# Patient Record
Sex: FEMALE | Race: WHITE | NOT HISPANIC OR LATINO
[De-identification: names, ages, dates, MRNs, and addresses within clinical notes are randomized per-mention and may not be internally consistent; named-entity substitution may affect disease eponyms.]

---

## 2017-06-27 ENCOUNTER — RESULT REVIEW (OUTPATIENT)
Age: 52
End: 2017-06-27

## 2017-09-22 ENCOUNTER — OUTPATIENT (OUTPATIENT)
Dept: OUTPATIENT SERVICES | Facility: HOSPITAL | Age: 52
LOS: 1 days | End: 2017-09-22
Payer: COMMERCIAL

## 2017-09-22 ENCOUNTER — APPOINTMENT (OUTPATIENT)
Dept: MAMMOGRAPHY | Facility: IMAGING CENTER | Age: 52
End: 2017-09-22

## 2017-09-22 DIAGNOSIS — Z00.8 ENCOUNTER FOR OTHER GENERAL EXAMINATION: ICD-10-CM

## 2017-09-22 PROCEDURE — 77067 SCR MAMMO BI INCL CAD: CPT

## 2017-09-22 PROCEDURE — 77063 BREAST TOMOSYNTHESIS BI: CPT

## 2017-09-22 PROCEDURE — 77063 BREAST TOMOSYNTHESIS BI: CPT | Mod: 26

## 2017-09-22 PROCEDURE — G0202: CPT | Mod: 26

## 2018-05-26 ENCOUNTER — TRANSCRIPTION ENCOUNTER (OUTPATIENT)
Age: 53
End: 2018-05-26

## 2018-06-02 ENCOUNTER — EMERGENCY (EMERGENCY)
Facility: HOSPITAL | Age: 53
LOS: 1 days | Discharge: HOME | End: 2018-06-02
Attending: EMERGENCY MEDICINE
Payer: COMMERCIAL

## 2018-06-02 VITALS
DIASTOLIC BLOOD PRESSURE: 80 MMHG | TEMPERATURE: 98 F | HEART RATE: 62 BPM | OXYGEN SATURATION: 96 % | SYSTOLIC BLOOD PRESSURE: 125 MMHG | WEIGHT: 156.97 LBS | HEIGHT: 67 IN | RESPIRATION RATE: 16 BRPM

## 2018-06-02 VITALS
RESPIRATION RATE: 18 BRPM | OXYGEN SATURATION: 99 % | DIASTOLIC BLOOD PRESSURE: 81 MMHG | HEART RATE: 60 BPM | TEMPERATURE: 98 F | SYSTOLIC BLOOD PRESSURE: 135 MMHG

## 2018-06-02 PROCEDURE — 93971 EXTREMITY STUDY: CPT

## 2018-06-02 PROCEDURE — 73590 X-RAY EXAM OF LOWER LEG: CPT | Mod: 26,LT

## 2018-06-02 PROCEDURE — 93971 EXTREMITY STUDY: CPT | Mod: 26

## 2018-06-02 PROCEDURE — 73590 X-RAY EXAM OF LOWER LEG: CPT

## 2018-06-02 PROCEDURE — 99284 EMERGENCY DEPT VISIT MOD MDM: CPT

## 2018-06-02 RX ORDER — AZTREONAM 2 G
1 VIAL (EA) INJECTION
Qty: 20 | Refills: 0 | OUTPATIENT
Start: 2018-06-02 | End: 2018-06-11

## 2018-06-02 RX ADMIN — Medication 1 TABLET(S): at 17:38

## 2018-06-02 NOTE — ED PROVIDER NOTE - MEDICAL DECISION MAKING DETAILS
52yoF pw left lower extremity erythema and edema after injury 8 days ago. likely cellulitis. will rule out fracture and dvt given edema. dc home with antibiotics.

## 2018-06-02 NOTE — ED PROVIDER NOTE - OBJECTIVE STATEMENT
52yoF pw left shin pain and swelling after injuring it 8 days ago. [place], scraping her shin. pain is mild in severity, non radiating, in the shin, constant. no fevers, chills vomiting, or other issues. did not hit head, did not pass out.

## 2018-06-02 NOTE — ED PROVIDER NOTE - CARE PLAN
Principal Discharge DX:	Cellulitis of left lower extremity Principal Discharge DX:	Cellulitis of left lower extremity  Assessment and plan of treatment:	1) Follow up with your doctor in 1 week. Call for an appointment.   2) Return to the ER immediately for new or worsening symptoms including fevers, chills vomiting or worsening redness and pain.   3) Take Bactrim two times a day for the next 10 days.

## 2018-06-02 NOTE — ED ADULT NURSE NOTE - OBJECTIVE STATEMENT
32 yo female presents in the ed for left foot pain- pt is alert and oriented x4, speaking coherently. [place] fell through the gap between the train and the platform 8 days ago- pt states that she went to urgent care 8 days ago when this happened- they did not xray the left foot at that time. Pt presents with swelling, pain and redness today. pt states that she can place weight on the left leg with increased pain. MD at bedside, will continue to reassess.

## 2018-06-02 NOTE — ED PROVIDER NOTE - ATTENDING CONTRIBUTION TO CARE
inj L shin 8 day ago - limited injury  L shin and ankle swelling w ant shin brusing and scab, 3cm x 3cm area x 2 to shin  concern for early cellulitis vs dvt (less likely). at request of sending physician will also xr.

## 2018-06-02 NOTE — ED PROVIDER NOTE - PLAN OF CARE
1) Follow up with your doctor in 1 week. Call for an appointment.   2) Return to the ER immediately for new or worsening symptoms including fevers, chills vomiting or worsening redness and pain.   3) Take Bactrim two times a day for the next 10 days.

## 2018-06-02 NOTE — ED PROVIDER NOTE - PROGRESS NOTE DETAILS
patient awaiting US read. ambulating without difficulty.   Josh GONZALEZ Called by US, problem with US crossing over, normal read by attending radiologist. will dc home.

## 2018-10-26 ENCOUNTER — APPOINTMENT (OUTPATIENT)
Dept: MAMMOGRAPHY | Facility: IMAGING CENTER | Age: 53
End: 2018-10-26
Payer: COMMERCIAL

## 2018-10-26 ENCOUNTER — OUTPATIENT (OUTPATIENT)
Dept: OUTPATIENT SERVICES | Facility: HOSPITAL | Age: 53
LOS: 1 days | End: 2018-10-26
Payer: COMMERCIAL

## 2018-10-26 DIAGNOSIS — Z00.8 ENCOUNTER FOR OTHER GENERAL EXAMINATION: ICD-10-CM

## 2018-10-26 PROCEDURE — 77067 SCR MAMMO BI INCL CAD: CPT

## 2018-10-26 PROCEDURE — 77063 BREAST TOMOSYNTHESIS BI: CPT

## 2018-10-26 PROCEDURE — 77063 BREAST TOMOSYNTHESIS BI: CPT | Mod: 26

## 2018-10-26 PROCEDURE — 77067 SCR MAMMO BI INCL CAD: CPT | Mod: 26

## 2019-05-05 ENCOUNTER — TRANSCRIPTION ENCOUNTER (OUTPATIENT)
Age: 54
End: 2019-05-05

## 2019-12-06 ENCOUNTER — APPOINTMENT (OUTPATIENT)
Dept: MAMMOGRAPHY | Facility: IMAGING CENTER | Age: 54
End: 2019-12-06
Payer: COMMERCIAL

## 2019-12-06 ENCOUNTER — OUTPATIENT (OUTPATIENT)
Dept: OUTPATIENT SERVICES | Facility: HOSPITAL | Age: 54
LOS: 1 days | End: 2019-12-06
Payer: COMMERCIAL

## 2019-12-06 DIAGNOSIS — Z00.8 ENCOUNTER FOR OTHER GENERAL EXAMINATION: ICD-10-CM

## 2019-12-06 PROCEDURE — 77067 SCR MAMMO BI INCL CAD: CPT

## 2019-12-06 PROCEDURE — 77063 BREAST TOMOSYNTHESIS BI: CPT

## 2019-12-06 PROCEDURE — 77063 BREAST TOMOSYNTHESIS BI: CPT | Mod: 26

## 2019-12-06 PROCEDURE — 77067 SCR MAMMO BI INCL CAD: CPT | Mod: 26

## 2019-12-18 ENCOUNTER — APPOINTMENT (OUTPATIENT)
Dept: MAMMOGRAPHY | Facility: IMAGING CENTER | Age: 54
End: 2019-12-18
Payer: COMMERCIAL

## 2019-12-18 ENCOUNTER — OUTPATIENT (OUTPATIENT)
Dept: OUTPATIENT SERVICES | Facility: HOSPITAL | Age: 54
LOS: 1 days | End: 2019-12-18
Payer: COMMERCIAL

## 2019-12-18 ENCOUNTER — APPOINTMENT (OUTPATIENT)
Dept: ULTRASOUND IMAGING | Facility: IMAGING CENTER | Age: 54
End: 2019-12-18
Payer: COMMERCIAL

## 2019-12-18 DIAGNOSIS — Z00.8 ENCOUNTER FOR OTHER GENERAL EXAMINATION: ICD-10-CM

## 2019-12-18 PROCEDURE — 76642 ULTRASOUND BREAST LIMITED: CPT | Mod: 26,RT

## 2019-12-18 PROCEDURE — 76642 ULTRASOUND BREAST LIMITED: CPT

## 2019-12-18 PROCEDURE — G0279: CPT | Mod: 26

## 2019-12-18 PROCEDURE — G0279: CPT

## 2019-12-18 PROCEDURE — 77065 DX MAMMO INCL CAD UNI: CPT | Mod: 26,RT

## 2019-12-18 PROCEDURE — 77065 DX MAMMO INCL CAD UNI: CPT

## 2020-05-22 ENCOUNTER — TRANSCRIPTION ENCOUNTER (OUTPATIENT)
Age: 55
End: 2020-05-22

## 2020-07-16 ENCOUNTER — APPOINTMENT (OUTPATIENT)
Dept: ORTHOPEDIC SURGERY | Facility: CLINIC | Age: 55
End: 2020-07-16
Payer: COMMERCIAL

## 2020-07-16 PROCEDURE — 73562 X-RAY EXAM OF KNEE 3: CPT | Mod: 50

## 2020-07-16 PROCEDURE — 99204 OFFICE O/P NEW MOD 45 MIN: CPT

## 2020-07-16 NOTE — ADDENDUM
[FreeTextEntry1] : I, Jc rCaig, acted solely as a scribe for Dr. Harley Vance on this date 07/16/2020.\par All medical record entries made by the Scribe were at my, Dr. Harley Vance, direction and personally dictated by me on 07/16/2020. I have reviewed the chart and agree that the record accurately reflects my personal performance of the history, physical exam, assessment and plan. I have also personally directed, reviewed, and agreed with the chart.

## 2020-07-16 NOTE — HISTORY OF PRESENT ILLNESS
[de-identified] : 55 year old female presents complaining of bilateral knee pain, left much worse than right. She has had knee pain in the past for which she did a few PT sessions but did not require further treatment. She denies injury at that time. About 3-4 weeks ago, she was running a felt pain in her knee. Her pain is medial bilaterally and worsens when pivoting and twisting. She rates her pain as a 3/10 in severity on average. Her knee does not buckle or swell. She has discomfort when going up and down stairs, and when dancing the [employer], as well. Walking on flat ground does not bother her and she can walk long distances with little to no pain. She cannot run due to sharp pain. She has minimal pain when sleeping at night. 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 She is very active and exercises regularly. She would like to be able to ski without pain.

## 2020-07-16 NOTE — PHYSICAL EXAM
[de-identified] : Constitutional\par o Appearance : well-nourished, well developed, alert, in no acute distress \par Head and Face\par o Head :\par ¦ Inspection : atraumatic, normocephalic\par o Face :\par ¦ Inspection : no visible rash or discoloration\par Respiratory\par o Respiratory Effort: breathing unlabored \par Neurologic\par o Mental Status Examination :\par ¦ Orientation : grossly oriented to person, place and time\par Psychiatric\par o Mood and Affect: mood normal, affect appropriate \par \par Right Lower Extremity\par o Buttock : no tenderness, swelling or deformities \par o Right Hip :\par ¦ Inspection/Palpation : no tenderness, swelling or deformities\par ¦ Range of Motion : full and painless in all planes, no crepitance\par ¦ Stability : joint stability intact\par ¦ Strength : extension, flexion, adduction, abduction, internal rotation and external rotation 5/5 \par \par o Right Knee :\par ¦ Inspection/Palpation : mild medial compartment tenderness, no swelling\par ¦ Range of Motion : 3-120°, no crepitance, decreased patellofemoral glide (right worse than left)\par ¦ Stability : no valgus or varus instability present on provocative testing\par ¦ Strength : flexion and extension 5/5\par ¦ Tests and Signs : Anterior Drawer negative, Lachman negative, Therese's negative\par \par Left Lower Extremity\par o Buttock : no tenderness, swelling or deformities \par o Left Hip :\par ¦ Inspection/Palpation : no tenderness, no swelling or deformities\par ¦ Range of Motion : full and painless in all planes, no crepitance\par ¦ Stability : joint stability intact\par ¦ Strength : extension, flexion, adduction, abduction, internal rotation and external rotation 5/5\par \par o Left Knee :\par ¦ Inspection/Palpation : mild medial compartment and minimal lateral compartment tenderness, no swelling\par ¦ Range of Motion : active flexion and extension full, discomfort with full flexion, no crepitance, slightly decreased patellofemoral glide\par ¦ Stability : no valgus or varus instability present on provocative testing\par ¦ Strength : flexion and extension 5/5\par ¦ Tests and Signs : Anterior Drawer negative, Lachman negative, Therese's negative\par \par Gait: varus deformity, no significant extremity swelling or lymphedema, excellent core strength\par \par Radiology Results:\par o Right Knee: Standing AP, lateral and tunnel views were obtained and revealed severe medial compartment narrowing with moderate patellofemoral arthritis.\par o Left Knee: Standing AP, lateral and tunnel views were obtained and revealed moderate medial and patellofemoral arthritis.

## 2020-07-16 NOTE — DISCUSSION/SUMMARY
[de-identified] : I discussed the underlying pathophysiology of the patient's condition in great detail with the patient. I went over the patient's x-rays with them in great detail. The extent of the patient’s arthritis was discussed in great detail with them. Various treatment modalities including NSAIDs, PT, bracing, cortisone injections, viscosupplementation, and surgical intervention were discussed as a solution for the patient's symptoms. We discussed the use of ice, Tylenol and anti-inflammatories to relieve pain. The patient was instructed in ROM exercises they are to do at home. At-home strengthening, and stretching exercises were discussed and demonstrated with the patient. We went over the wide ranging benefits of exercise for the patient health and I encouraged her to begin a diligent exercise program. She needs to avoid high-impact activities that involve pounding, such as running, jumping, and [place] skiing. She should avoid twisting and pivoting. I recommend alternate activities such as yoga, pilates, barre classes, toning classes, and riding a stationary bike on low tension. Viscosupplementation was discussed as a solution to the patient's symptoms, and we are requesting authorization for Monovisc for both knees. All of her questions were answered. She understands and consents to the plan.\par \par FU after viscosupplementation authorization.\par after following a diligent HEP.

## 2020-07-27 RX ORDER — HYALURONATE SODIUM, STABILIZED 88 MG/4 ML
88 SYRINGE (ML) INTRAARTICULAR
Qty: 2 | Refills: 0 | Status: ACTIVE | COMMUNITY
Start: 2020-07-27

## 2020-07-28 ENCOUNTER — FORM ENCOUNTER (OUTPATIENT)
Age: 55
End: 2020-07-28

## 2020-08-02 ENCOUNTER — FORM ENCOUNTER (OUTPATIENT)
Age: 55
End: 2020-08-02

## 2020-12-16 ENCOUNTER — APPOINTMENT (OUTPATIENT)
Dept: ORTHOPEDIC SURGERY | Facility: CLINIC | Age: 55
End: 2020-12-16
Payer: COMMERCIAL

## 2020-12-16 PROCEDURE — 99072 ADDL SUPL MATRL&STAF TM PHE: CPT

## 2020-12-16 PROCEDURE — 20610 DRAIN/INJ JOINT/BURSA W/O US: CPT | Mod: RT

## 2020-12-16 PROCEDURE — 99214 OFFICE O/P EST MOD 30 MIN: CPT | Mod: 25

## 2021-01-25 ENCOUNTER — FORM ENCOUNTER (OUTPATIENT)
Age: 56
End: 2021-01-25

## 2021-01-25 ENCOUNTER — APPOINTMENT (OUTPATIENT)
Dept: ORTHOPEDIC SURGERY | Facility: CLINIC | Age: 56
End: 2021-01-25
Payer: COMMERCIAL

## 2021-01-25 PROCEDURE — 99072 ADDL SUPL MATRL&STAF TM PHE: CPT

## 2021-01-25 PROCEDURE — 20610 DRAIN/INJ JOINT/BURSA W/O US: CPT | Mod: LT

## 2021-01-25 PROCEDURE — 99214 OFFICE O/P EST MOD 30 MIN: CPT | Mod: 25

## 2021-01-25 RX ORDER — HYALURONATE SODIUM, STABILIZED 88 MG/4 ML
88 SYRINGE (ML) INTRAARTICULAR
Qty: 2 | Refills: 0 | Status: ACTIVE | COMMUNITY
Start: 2021-01-25

## 2021-01-28 ENCOUNTER — APPOINTMENT (OUTPATIENT)
Dept: MAMMOGRAPHY | Facility: IMAGING CENTER | Age: 56
End: 2021-01-28
Payer: COMMERCIAL

## 2021-01-28 ENCOUNTER — OUTPATIENT (OUTPATIENT)
Dept: OUTPATIENT SERVICES | Facility: HOSPITAL | Age: 56
LOS: 1 days | End: 2021-01-28
Payer: COMMERCIAL

## 2021-01-28 DIAGNOSIS — Z00.8 ENCOUNTER FOR OTHER GENERAL EXAMINATION: ICD-10-CM

## 2021-01-28 PROCEDURE — 77067 SCR MAMMO BI INCL CAD: CPT

## 2021-01-28 PROCEDURE — 77063 BREAST TOMOSYNTHESIS BI: CPT | Mod: 26

## 2021-01-28 PROCEDURE — 77067 SCR MAMMO BI INCL CAD: CPT | Mod: 26

## 2021-01-28 PROCEDURE — 77063 BREAST TOMOSYNTHESIS BI: CPT

## 2021-02-22 ENCOUNTER — APPOINTMENT (OUTPATIENT)
Dept: ORTHOPEDIC SURGERY | Facility: CLINIC | Age: 56
End: 2021-02-22
Payer: COMMERCIAL

## 2021-02-22 PROCEDURE — 20610 DRAIN/INJ JOINT/BURSA W/O US: CPT | Mod: RT

## 2021-02-22 PROCEDURE — 99072 ADDL SUPL MATRL&STAF TM PHE: CPT

## 2021-02-22 NOTE — DISCUSSION/SUMMARY
[de-identified] : I went over the pathophysiology of the patient's symptoms in great detail with the patient. The patient elected to receive a Monovisc injection into her right knee today, and tolerated it well. I instructed the patient on ROM exercises, and told them to take it easy. The use of ice and rest was reviewed with the patient. The patient may resume activities tomorrow. I reminded the patient that it takes 4 to 6 weeks after the injection to feel symptom relief. All of her questions were answered. She understands and consents to the plan. \par \par FU 1-2 weeks.\par after a right knee Monovisc injection today (02/22/2021).\par for a left knee Synvisc-One injection.

## 2021-02-22 NOTE — PHYSICAL EXAM
[de-identified] : Constitutional\par o Appearance : well-nourished, well developed, alert, in no acute distress \par Head and Face\par o Head :\par ¦ Inspection : atraumatic, normocephalic\par o Face :\par ¦ Inspection : no visible rash or discoloration\par Respiratory\par o Respiratory Effort: breathing unlabored \par Neurologic\par o Mental Status Examination :\par ¦ Orientation : grossly oriented to person, place and time\par Psychiatric\par o Mood and Affect: mood normal, affect appropriate \par \par Right Lower Extremity\par o Buttock : no tenderness, swelling or deformities \par o Right Hip :\par ¦ Inspection/Palpation : no tenderness, swelling or deformities\par ¦ Range of Motion : full and painless in all planes, no crepitance\par ¦ Stability : joint stability intact\par ¦ Strength : extension, flexion, adduction, abduction, internal rotation and external rotation, 5/5 \par \par o Right Knee :\par ¦ Inspection/Palpation : mild to moderate  medial or lateral compartment tenderness to palpation, no swelling\par ¦ Range of Motion : 5-120° without pain, no crepitance, decreased patellofemoral glide\par ¦ Stability : no valgus or varus instability present on provocative testing\par ¦ Strength : flexion and extension 5/5\par ¦ Tests and Signs : Anterior Drawer negative, Lachman negative, Therese's negative\par \par Left Lower Extremity\par o Buttock : no tenderness, swelling or deformities \par o Left Hip :\par ¦ Inspection/Palpation : no tenderness, no swelling or deformities\par ¦ Range of Motion : full and painless in all planes, no crepitance\par ¦ Stability : joint stability intact\par ¦ Strength : extension, flexion, adduction, abduction, internal rotation and external rotation, 5/5\par \par o Left Knee :\par ¦ Inspection/Palpation : posteromedial joint line tenderness, no swelling\par ¦ Range of Motion : FROM, mild pain on full flexion, no crepitance, decreased patellofemoral glide\par ¦ Stability : no valgus or varus instability present on provocative testing\par ¦ Strength : flexion and extension 5/5\par ¦ Tests and Signs : Anterior Drawer negative, Lachman negative, Therese's negative\par \par Gait: varus deformity bilaterally, no significant extremity swelling or lymphedema, excellent core strength\par \par o Right Knee injection : Indication- knee osteoarthritis, Anatomic location- right intra-articular joint space, Spray - area was sterilized with Betadine and alcohol and anesthetized with Ethyl Chloride, needle used-20G, Medications given- 4cc's Monovisc under Ultrasound guidance, and patient tolerated it well.\par oLerika# [id number]  oExp: 2022-11-30

## 2021-02-22 NOTE — HISTORY OF PRESENT ILLNESS
[de-identified] : 55 year old female presents complaining of bilateral knee pain. Her pain is medial bilaterally and worsens when pivoting and twisting. She has discomfort when going up and down stairs, and when dancing the waltz. She has no pain walking on flat ground. She denies buckling or swelling. She is very active and exercises regularly. She received a right knee cortisone injection on 12/16/2020, and a left knee cortisone injection on 1/25/2021. She presents for right knee viscosupplementation today (02/22/2021).\par \par Radiology Results done 7/16/2020:\par o Right Knee: Standing AP, lateral and tunnel views were obtained and revealed significant medial and moderate patellofemoral arthritis.\par o Left Knee: Standing AP, lateral and tunnel views were obtained and revealed moderate medial and patellofemoral arthritis.

## 2021-02-22 NOTE — ADDENDUM
[FreeTextEntry1] : I, Jc Craig, acted solely as a scribe for Dr. Harley Vance on this date 02/22/2021.\par All medical record entries made by the Scribe were at my, Dr. Harley Vance, direction and personally dictated by me on 02/22/2021. I have reviewed the chart and agree that the record accurately reflects my personal performance of the history, physical exam, assessment and plan. I have also personally directed, reviewed, and agreed with the chart.

## 2021-02-25 ENCOUNTER — APPOINTMENT (OUTPATIENT)
Dept: ORTHOPEDIC SURGERY | Facility: CLINIC | Age: 56
End: 2021-02-25
Payer: COMMERCIAL

## 2021-02-25 PROCEDURE — 20610 DRAIN/INJ JOINT/BURSA W/O US: CPT | Mod: LT

## 2021-02-25 PROCEDURE — 99072 ADDL SUPL MATRL&STAF TM PHE: CPT

## 2021-02-25 NOTE — DISCUSSION/SUMMARY
[de-identified] : I went over the pathophysiology of the patient's symptoms in great detail with the patient. The patient elected to receive a Synvisc-One injection into her left knee today, and tolerated it well. I instructed the patient on ROM exercises, and told them to take it easy. The use of ice and rest was reviewed with the patient. The patient may resume activities tomorrow. I reminded the patient that it takes 4 to 6 weeks after the injection to feel symptom relief. All of her questions were answered. She understands and consents to the plan.\par \par FU 6 weeks.\par after a left knee Synvisc-One injection today (02/25/2021).

## 2021-02-25 NOTE — PHYSICAL EXAM
[de-identified] : Constitutional\par o Appearance : well-nourished, well developed, alert, in no acute distress \par Head and Face\par o Head :\par ¦ Inspection : atraumatic, normocephalic\par o Face :\par ¦ Inspection : no visible rash or discoloration\par Respiratory\par o Respiratory Effort: breathing unlabored \par Neurologic\par o Mental Status Examination :\par ¦ Orientation : grossly oriented to person, place and time\par Psychiatric\par o Mood and Affect: mood normal, affect appropriate \par \par Right Lower Extremity\par o Buttock : no tenderness, swelling or deformities \par o Right Hip :\par ¦ Inspection/Palpation : no tenderness, swelling or deformities\par ¦ Range of Motion : full and painless in all planes, no crepitance\par ¦ Stability : joint stability intact\par ¦ Strength : extension, flexion, adduction, abduction, internal rotation and external rotation, 5/5 \par \par o Right Knee :\par ¦ Inspection/Palpation : mild medial or lateral compartment tenderness to palpation, no swelling\par ¦ Range of Motion : 5-120° without pain, no crepitance, decreased patellofemoral glide\par ¦ Stability : no valgus or varus instability present on provocative testing\par ¦ Strength : flexion and extension 5/5\par ¦ Tests and Signs : Anterior Drawer negative, Lachman negative, Therese's negative\par \par Left Lower Extremity\par o Buttock : no tenderness, swelling or deformities \par o Left Hip :\par ¦ Inspection/Palpation : no tenderness, no swelling or deformities\par ¦ Range of Motion : full and painless in all planes, no crepitance\par ¦ Stability : joint stability intact\par ¦ Strength : extension, flexion, adduction, abduction, internal rotation and external rotation, 5/5\par \par o Left Knee :\par ¦ Inspection/Palpation :moderate posteromedial joint line tenderness, no swelling\par ¦ Range of Motion : FROM, mild pain on full flexion, no crepitance, decreased patellofemoral glide\par ¦ Stability : no valgus or varus instability present on provocative testing\par ¦ Strength : flexion and extension 5/5\par ¦ Tests and Signs : Anterior Drawer negative, Lachman negative, Therese's negative\par \par Gait: varus deformity bilaterally, no significant extremity swelling or lymphedema, excellent core strength\par \par o Left Knee injection : Indication- knee osteoarthritis, Anatomic location- left intra-articular joint space, Spray - area was sterilized with Betadine and alcohol and anesthetized with Ethyl Chloride, needle used-20G, Medications given- 6cc's Synvisc-One under Ultrasound guidance, and patient tolerated it well. \par Annabel# [id number]  oExp: 2023-02-28

## 2021-02-25 NOTE — HISTORY OF PRESENT ILLNESS
[de-identified] : 55 year old female presents complaining of bilateral knee pain. Her pain is medial bilaterally and worsens when pivoting and twisting. She has discomfort when going up and down stairs, and when dancing the waltz. She has no pain walking on flat ground. She denies buckling or swelling. She is very active and exercises regularly. She received a right knee cortisone injection on 12/16/2020, and a left knee cortisone injection on 1/25/2021. She received a right knee Monovisc injection on 2/22/2021. She presents for a left knee Synvisc-One injection today (02/25/2021). She denies any allergies to chickens, eggs or feathers.\par \par Radiology Results done 7/16/2020:\par o Right Knee: Standing AP, lateral and tunnel views were obtained and revealed significant medial and moderate patellofemoral arthritis.\par o Left Knee: Standing AP, lateral and tunnel views were obtained and revealed moderate medial and patellofemoral arthritis.

## 2021-02-25 NOTE — ADDENDUM
[FreeTextEntry1] : I, Jc Craig, acted solely as a scribe for Dr. Harley Vance on this date 02/25/2021.\par All medical record entries made by the Scribe were at my, Dr. Harley Vance, direction and personally dictated by me on 02/25/2021. I have reviewed the chart and agree that the record accurately reflects my personal performance of the history, physical exam, assessment and plan. I have also personally directed, reviewed, and agreed with the chart.

## 2021-04-08 ENCOUNTER — APPOINTMENT (OUTPATIENT)
Dept: ORTHOPEDIC SURGERY | Facility: CLINIC | Age: 56
End: 2021-04-08
Payer: COMMERCIAL

## 2021-04-08 PROCEDURE — 99072 ADDL SUPL MATRL&STAF TM PHE: CPT

## 2021-04-08 PROCEDURE — 99214 OFFICE O/P EST MOD 30 MIN: CPT

## 2021-04-08 NOTE — HISTORY OF PRESENT ILLNESS
[de-identified] : 55 year old female presents with bilateral medial knee pain. Her pain is exacerbated by pivoting and twisting. She notes discomfort when going up and down stairs, and when dancing the waltz. She has no pain walking on flat ground. She denies buckling or swelling. She is very active and exercises regularly. She received right knee cortisone on 12/16/20 and Monovisc on 2/22/21. She reports that her right knee is doing better after her Monovisc injection. She received left knee cortisone on 1/25/21 and Synvisc-One on 2/25/21. She does not feel that the Synvisc-One gave her any relief. She feels that her left knee is worse than it was before the injection.\par \par Radiology Results done 7/16/2020:\par o Right Knee: Standing AP, lateral and tunnel views were obtained and revealed significant medial and moderate patellofemoral arthritis.\par o Left Knee: Standing AP, lateral and tunnel views were obtained and revealed moderate medial and patellofemoral arthritis.

## 2021-04-08 NOTE — ADDENDUM
[FreeTextEntry1] : I, Jc Craig, acted solely as a scribe for Dr. Harley Vance on this date 04/08/2021.\par All medical record entries made by the Scribe were at my, Dr. Harley Vance, direction and personally dictated by me on 04/08/2021. I have reviewed the chart and agree that the record accurately reflects my personal performance of the history, physical exam, assessment and plan. I have also personally directed, reviewed, and agreed with the chart.

## 2021-04-08 NOTE — DISCUSSION/SUMMARY
[de-identified] : I went over the pathophysiology of the patient's symptoms in great detail with the patient. We discussed the use of ice, Tylenol and anti-inflammatories to relieve pain. At-home strengthening, and stretching exercises were discussed and demonstrated with the patient. She needs to avoid high-impact activities such as running and jumping. I recommend alternative activities such as riding a stationary bike on low tension, or the elliptical. She should focus on light weight and high repetition exercises. She should avoid squatting and kneeling. I discussed ergonomic solutions with the patient such as wearing cushioned sneakers with a thicker sole. I would like to see the patient back in the office in 2 months to reassess her condition. If she is still having left knee pain a cortisone injection will be considered. All of her questions were answered. She understands and consents to the plan. \par \par FU 2 months.

## 2021-04-08 NOTE — PHYSICAL EXAM
[de-identified] : Constitutional\par o Appearance : well-nourished, well developed, alert, in no acute distress \par Head and Face\par o Head :\par ¦ Inspection : atraumatic, normocephalic\par o Face :\par ¦ Inspection : no visible rash or discoloration\par Respiratory\par o Respiratory Effort: breathing unlabored \par Neurologic\par o Mental Status Examination :\par ¦ Orientation : grossly oriented to person, place and time\par Psychiatric\par o Mood and Affect: mood normal, affect appropriate \par \par Right Lower Extremity\par o Buttock : no tenderness, swelling or deformities \par o Right Hip :\par ¦ Inspection/Palpation : no tenderness, swelling or deformities\par ¦ Range of Motion : full and painless in all planes, no crepitance\par ¦ Stability : joint stability intact\par ¦ Strength : extension, flexion, adduction, abduction, internal rotation and external rotation, 5/5 \par \par o Right Knee :\par ¦ Inspection/Palpation : mild medial compartment tenderness to palpation, no swelling\par ¦ Range of Motion : 3-120° without pain, no crepitance, decreased patellofemoral glide\par ¦ Stability : no valgus or varus instability present on provocative testing\par ¦ Strength : flexion and extension 5/5\par ¦ Tests and Signs : Anterior Drawer negative, Lachman negative, Therese's negative\par \par Left Lower Extremity\par o Buttock : no tenderness, swelling or deformities \par o Left Hip :\par ¦ Inspection/Palpation : no tenderness, no swelling or deformities\par ¦ Range of Motion : full and painless in all planes, no crepitance\par ¦ Stability : joint stability intact\par ¦ Strength : extension, flexion, adduction, abduction, internal rotation and external rotation, 5/5\par \par o Left Knee :\par ¦ Inspection/Palpation : mild medial compartment tenderness, lateral compartment , no swelling\par ¦ Range of Motion : FROM, lateral compartment pain on full flexion, no crepitance, decreased patellofemoral glide\par ¦ Stability : no valgus or varus instability present on provocative testing\par ¦ Strength : flexion and extension 5/5\par ¦ Tests and Signs : Anterior Drawer negative, Lachman negative, Therese's negative\par \par Gait: varus deformity bilaterally, no significant extremity swelling or lymphedema, excellent core strength

## 2021-12-06 ENCOUNTER — APPOINTMENT (OUTPATIENT)
Dept: ORTHOPEDIC SURGERY | Facility: CLINIC | Age: 56
End: 2021-12-06
Payer: COMMERCIAL

## 2021-12-06 PROCEDURE — 99214 OFFICE O/P EST MOD 30 MIN: CPT

## 2021-12-06 PROCEDURE — 73562 X-RAY EXAM OF KNEE 3: CPT | Mod: 50

## 2021-12-06 RX ORDER — HYALURONATE SODIUM, STABILIZED 88 MG/4 ML
88 SYRINGE (ML) INTRAARTICULAR
Qty: 2 | Refills: 0 | Status: ACTIVE | COMMUNITY
Start: 2021-12-06

## 2021-12-06 NOTE — ADDENDUM
[FreeTextEntry1] : I, Jc Craig, acted solely as a scribe for Dr. Harley Vance on this date 12/06/2021.\par All medical record entries made by the Scribe were at my, Dr. Halrey Vance, direction and personally dictated by me on 12/06/2021. I have reviewed the chart and agree that the record accurately reflects my personal performance of the history, physical exam, assessment and plan. I have also personally directed, reviewed, and agreed with the chart.

## 2021-12-06 NOTE — DISCUSSION/SUMMARY
[de-identified] : I discussed the underlying pathophysiology of the patient's condition in great detail with the patient. I went over the patient's x-rays with them in great detail. The extent of the patient’s arthritis was discussed in great detail with them. She needs to avoid high-impact activities such as running and jumping. I recommend alternate activities such as yoga, Pilates, barre classes, swimming, toning classes, and riding a stationary bike on low tension. I am recommending she continues with viscosupplementation treatments. The patient had Monovisc into the knee that was worse, and the other knee got Synvisc-One. The patient did very well with the Monovisc injection and we are requesting authorization for Monovisc for both knees. All of her questions were answered. She understands and consents to the plan.\par \par FU 2-3 weeks.\par after viscosupplementation authorization.

## 2021-12-06 NOTE — HISTORY OF PRESENT ILLNESS
[de-identified] : 56 year old female presents complaining of bilateral knee pain, right worse than left. Into the right knee, she received a cortisone injection on 12/16/20 and Monovisc injection on 2/22/21. Into the left knee, she received a cortisone injection on 11/25/21 and Synvisc-One injection on 2/25/21. She noted relief after these injections, but feels that Monovisc injection helped her more than Synvisc-One. Her knees have been aggravated more recently. Her discomfort is a 1-2/10 on average. It is aggravated by pivoting and twisting. She notes discomfort with step climbing and dancing. She has no pain walking on flat ground. She notes decreased flexion in the right knee compared to the left knee. She denies buckling or swelling.

## 2021-12-20 ENCOUNTER — APPOINTMENT (OUTPATIENT)
Dept: ORTHOPEDIC SURGERY | Facility: CLINIC | Age: 56
End: 2021-12-20
Payer: COMMERCIAL

## 2021-12-20 PROCEDURE — 20611 DRAIN/INJ JOINT/BURSA W/US: CPT | Mod: RT

## 2021-12-20 NOTE — PHYSICAL EXAM
[de-identified] : Constitutional\par o Appearance : well-nourished, well developed, alert, in no acute distress \par Head and Face\par o Head :\par ¦ Inspection : atraumatic, normocephalic\par o Face :\par ¦ Inspection : no visible rash or discoloration\par Respiratory\par o Respiratory Effort: breathing unlabored \par Neurologic\par o Mental Status Examination :\par ¦ Orientation : grossly oriented to person, place and time\par Psychiatric\par o Mood and Affect: mood normal, affect appropriate \par \par Right Lower Extremity\par o Buttock : no tenderness, swelling or deformities \par o Right Hip :\par ¦ Inspection/Palpation : no tenderness, swelling or deformities\par ¦ Range of Motion : full and painless in all planes, no crepitance\par ¦ Stability : joint stability intact\par ¦ Strength : extension, flexion, adduction, abduction, internal rotation and external rotation, 5/5 \par \par o Right Knee :\par ¦ Inspection/Palpation : mild medial compartment tenderness to palpation, no swelling\par ¦ Range of Motion : FROM, discomfort with full flexion, decreased patellofemoral glide with crepitance\par ¦ Stability : no valgus or varus instability present on provocative testing\par ¦ Strength : flexion and extension 5/5\par ¦ Tests and Signs : Anterior Drawer negative, Lachman negative, Therese's negative\par \par Left Lower Extremity\par o Buttock : no tenderness, swelling or deformities \par o Left Hip :\par ¦ Inspection/Palpation : no tenderness, no swelling or deformities\par ¦ Range of Motion : full and painless in all planes, no crepitance\par ¦ Stability : joint stability intact\par ¦ Strength : extension, flexion, adduction, abduction, internal rotation and external rotation, 5/5\par \par o Left Knee :\par ¦ Inspection/Palpation : mild medial compartment tenderness, lateral compartment tenderness, mild swelling\par ¦ Range of Motion : 0-135°, no crepitance, decreased patellofemoral glide\par ¦ Stability : no valgus or varus instability present on provocative testing\par ¦ Strength : flexion and extension 5/5\par ¦ Tests and Signs : Anterior Drawer negative, Lachman negative, Therese's negative\par \par Gait: varus deformity bilaterally, no significant extremity swelling or lymphedema, excellent core strength\par \par o Right Knee injection : Indication- knee osteoarthritis, Anatomic location- right intra-articular joint space, Spray - area was sterilized with Betadine and alcohol and anesthetized with Ethyl Chloride, needle used-20G, Medications given- 3cc's Durolane under Ultrasound guidance. The patient tolerated the procedure well.  oLot# [id number]   oExp: 2024-05-31

## 2021-12-20 NOTE — HISTORY OF PRESENT ILLNESS
[de-identified] : 56 year old female presents complaining of bilateral knee pain, right worse than left. She had cortisone in 12/2020 and viscosupplementation in 02/2021 in both knees. She noted relief from these injections. Her knees have been aggravated more recently. Her discomfort is a 1-2/10 on average. It is aggravated by pivoting and twisting. She notes discomfort with step climbing and dancing. She has no pain walking on flat ground. She notes decreased flexion in the right knee compared to the left knee. She denies buckling or swelling. She presents for a right knee Durolane injection today (12/20/2021). \par \par Radiology Results taken on 12/06/2021:\par o Right Knee : Standing AP, lateral and tunnel views of the knee were obtained and revealed bone on bone in the medial compartment with moderate patellofemoral arthritis. \par o Left Knee : Standing AP, lateral and tunnel views of the knee were obtained and revealed almost bone on bone in the medial compartment with moderate patellofemoral arthritis.

## 2021-12-20 NOTE — ADDENDUM
[FreeTextEntry1] : I, Jc Craig, acted solely as a scribe for Dr. Harley Vance on this date 12/20/2021.\par All medical record entries made by the Scribe were at my, Dr. Harley Vance, direction and personally dictated by me on 12/20/2021. I have reviewed the chart and agree that the record accurately reflects my personal performance of the history, physical exam, assessment and plan. I have also personally directed, reviewed, and agreed with the chart.

## 2021-12-29 ENCOUNTER — APPOINTMENT (OUTPATIENT)
Dept: ORTHOPEDIC SURGERY | Facility: CLINIC | Age: 56
End: 2021-12-29
Payer: COMMERCIAL

## 2021-12-29 PROCEDURE — 20611 DRAIN/INJ JOINT/BURSA W/US: CPT | Mod: LT

## 2021-12-29 NOTE — HISTORY OF PRESENT ILLNESS
[de-identified] : 56 year old female presents complaining of bilateral knee pain, right worse than left. Her discomfort is a 1-2/10 on average. It is aggravated by pivoting and twisting. She notes discomfort with step climbing and dancing. She has no pain walking on flat ground. She notes decreased flexion in the right knee compared to the left knee. She denies buckling or swelling. She received a right knee Durolane injection on 12/20/2021 and denies a reaction to it. She presents for a left knee Durolane injection today (12/29/2021).\par \par Radiology Results taken on 12/06/2021:\par o Right Knee : Standing AP, lateral and tunnel views of the knee were obtained and revealed bone on bone in the medial compartment with moderate patellofemoral arthritis. \par o Left Knee : Standing AP, lateral and tunnel views of the knee were obtained and revealed almost bone on bone in the medial compartment with moderate patellofemoral arthritis.

## 2021-12-29 NOTE — DISCUSSION/SUMMARY
[de-identified] : I went over the pathophysiology of the patient's symptoms in great detail with the patient. The patient elected to receive a Durolane injection into her left knee today, and tolerated it well. I instructed the pt on ROM exercises, and told them to take it easy. The use of ice and rest was reviewed with the patient. The patient may resume activities tomorrow. I reminded the patient that it takes 4 to 6 weeks after the injection to feel symptom relief. All of her questions were answered. She understands and consents to the plan. \par \par FU 6 weeks.\par after a left knee Durolane injection today (12/29/2021).

## 2021-12-29 NOTE — ADDENDUM
[FreeTextEntry1] : I, Jc Craig, acted solely as a scribe for Dr. Harley Vance on this date 12/29/2021.\par All medical record entries made by the Scribe were at my, Dr. Harley Vance, direction and personally dictated by me on 12/29/2021. I have reviewed the chart and agree that the record accurately reflects my personal performance of the history, physical exam, assessment and plan. I have also personally directed, reviewed, and agreed with the chart.

## 2021-12-29 NOTE — PHYSICAL EXAM
[de-identified] : Constitutional\par o Appearance : well-nourished, well developed, alert, in no acute distress \par Head and Face\par o Head :\par ¦ Inspection : atraumatic, normocephalic\par o Face :\par ¦ Inspection : no visible rash or discoloration\par Respiratory\par o Respiratory Effort: breathing unlabored \par Neurologic\par o Mental Status Examination :\par ¦ Orientation : grossly oriented to person, place and time\par Psychiatric\par o Mood and Affect: mood normal, affect appropriate \par \par Right Lower Extremity\par o Buttock : no tenderness, swelling or deformities \par o Right Hip :\par ¦ Inspection/Palpation : no tenderness, swelling or deformities\par ¦ Range of Motion : full and painless in all planes, no crepitance\par ¦ Stability : joint stability intact\par ¦ Strength : extension, flexion, adduction, abduction, internal rotation and external rotation, 5/5 \par \par o Right Knee :\par ¦ Inspection/Palpation : minimal  medial compartment tenderness to palpation, no swelling\par ¦ Range of Motion : FROM, discomfort with full flexion, decreased patellofemoral glide with crepitance\par ¦ Stability : no valgus or varus instability present on provocative testing\par ¦ Strength : flexion and extension 5/5\par ¦ Tests and Signs : Anterior Drawer negative, Lachman negative, Therese's negative\par \par Left Lower Extremity\par o Buttock : no tenderness, swelling or deformities \par o Left Hip :\par ¦ Inspection/Palpation : no tenderness, no swelling or deformities\par ¦ Range of Motion : full and painless in all planes, no crepitance\par ¦ Stability : joint stability intact\par ¦ Strength : extension, flexion, adduction, abduction, internal rotation and external rotation, 5/5\par \par o Left Knee :\par ¦ Inspection/Palpation :  medial compartment tenderness, lateral compartment tenderness, mild swelling\par ¦ Range of Motion : 0-135°, no crepitance, decreased patellofemoral glide\par ¦ Stability : no valgus or varus instability present on provocative testing\par ¦ Strength : flexion and extension 5/5\par ¦ Tests and Signs : Anterior Drawer negative, Lachman negative, Therese's negative\par \par Gait: varus deformity bilaterally, no significant extremity swelling or lymphedema, excellent core strength\par \par o Left Knee injection : Indication- knee osteoarthritis, Anatomic location- left intra-articular joint space, Spray - area was sterilized with Betadine and alcohol and anesthetized with Ethyl Chloride, needle used-20G, Medications given- 3cc's Durolane under Ultrasound guidance. The patient tolerated the procedure well.  oLot# [id number]   oExp: 2024-07-31

## 2022-01-06 ENCOUNTER — APPOINTMENT (OUTPATIENT)
Dept: ORTHOPEDIC SURGERY | Facility: CLINIC | Age: 57
End: 2022-01-06
Payer: COMMERCIAL

## 2022-01-06 VITALS — BODY MASS INDEX: 25.11 KG/M2 | HEIGHT: 67 IN | WEIGHT: 160 LBS

## 2022-01-06 PROCEDURE — 99214 OFFICE O/P EST MOD 30 MIN: CPT

## 2022-01-06 PROCEDURE — 73030 X-RAY EXAM OF SHOULDER: CPT | Mod: LT

## 2022-01-06 NOTE — PHYSICAL EXAM
[de-identified] : Right Upper Extremity\par o Shoulder :\par ¦ Inspection/Palpation :  no tenderness over the greater tuberosity, no acromioclavicular joint tenderness, no tenderness anterior and posterior glenohumeral joint,no swelling, no deformities\par ¦ Range of Motion : ACTIVE FORWARD ELEVATION: Measured at 160 degrees, ACTIVE EXTERNAL ROTATION: Measured at 75 degrees, ACTIVE INTERNAL ROTATION: Measured at T4\par ¦ Strength : external rotation 5/5, internal rotation 5/5, supraspinatus 5/5\par ¦ Stability : no joint instability on provocative testing\par ¦ Tests/Signs : Neer (-), Saul (-)\par o Upper Arm : no tenderness, no swelling, no deformities\par o Muscle Bulk : no atrophy\par o Sensation : sensation intact to light touch\par o Skin : no skin rash or discoloration\par o Vascular Exam : no edema, no cyanosis, radial and ulnar pulses normal \par \par Left Upper Extremity\par o Shoulder :\par ¦ Inspection/Palpation :  no tenderness over the greater tuberosity, biceps tendon and musculature tenderness to palpation, no acromioclavicular joint tenderness, no tenderness anterior and posterior glenohumeral joint,no swelling, no deformities\par ¦ Range of Motion : ACTIVE FORWARD ELEVATION: Measured at 150 degrees, ACTIVE EXTERNAL ROTATION: Measured at 75 degrees, ACTIVE INTERNAL ROTATION: Measured at T8\par ¦ Strength : external rotation 5/5, internal rotation 5/5, supraspinatus 5-/5 with marked pain \par ¦ Stability : no joint instability on provocative testing\par ¦ Tests/Signs : Neer (+), Saul (+), Speed’s Test (+) \par o Upper Arm : no tenderness, no swelling, no deformities\par o Muscle Bulk : no atrophy\par o Sensation : sensation intact to light touch\par o Skin : no skin rash or discoloration\par o Vascular Exam : no edema, no cyanosis, radial and ulnar pulses normal  [de-identified] : o Left Shoulder : Grashey, Axillary and Outlet views were obtained, there are no soft tissue abnormalities, no fractures, alignment is normal, mild glenohumeral osteoarthritis  normal bone density, no bony lesions.\par \par

## 2022-01-06 NOTE — HISTORY OF PRESENT ILLNESS
[de-identified] : BRIAN IBRAHIM is a 56 year old LHD female presenting to the office complaining of left shoulder pain.  Patient reports pain for the past two weeks. She went skiing prior to the onset of pain  but  denies injury or trauma to the area. The patient describes the pain as a dull aching, and occasionally sharp pain localized to the anterior aspect of her  left shoulder that is intermittent in nature. The pain radiates down the lateral aspect of her upper arm Her  symptoms are  worsened by lifting, repetitive use/activity, lying on the affected side, and overhead activities.  Patient reports the pain is not waking her up at night.  Patient reports associated weakness.  Patient has completed a home exercise program noting improvements in strength and range of motion but not pain. \par Patient is taking NSAIDs for pain relief with mild to moderate relief in symptoms.\par

## 2022-01-06 NOTE — DISCUSSION/SUMMARY
[de-identified] : The underlying pathophysiology was reviewed in great detail with the patient as well as the various treatment options, including ice, analgesics, NSAIDs, Physical therapy, steroid injections.\par \par Activity modifications and restrictions were discussed. I advised avoiding overhead lifting. I advised the patient to work on good posture.\par \par A home exercise sheet was given and discussed with the patient to follow. \par \par A prescription for Physical Therapy was provided.\par \par FU 6 weeks \par \par All questions were answered, all alternatives discussed and the patient is in complete agreement with that plan. Follow-up appointment as instructed. Any issues and the patient will call or come in sooner.

## 2022-02-11 ENCOUNTER — APPOINTMENT (OUTPATIENT)
Dept: ULTRASOUND IMAGING | Facility: IMAGING CENTER | Age: 57
End: 2022-02-11
Payer: COMMERCIAL

## 2022-02-11 ENCOUNTER — OUTPATIENT (OUTPATIENT)
Dept: OUTPATIENT SERVICES | Facility: HOSPITAL | Age: 57
LOS: 1 days | End: 2022-02-11
Payer: COMMERCIAL

## 2022-02-11 ENCOUNTER — APPOINTMENT (OUTPATIENT)
Dept: MAMMOGRAPHY | Facility: IMAGING CENTER | Age: 57
End: 2022-02-11
Payer: COMMERCIAL

## 2022-02-11 DIAGNOSIS — Z00.8 ENCOUNTER FOR OTHER GENERAL EXAMINATION: ICD-10-CM

## 2022-02-11 PROCEDURE — 77067 SCR MAMMO BI INCL CAD: CPT

## 2022-02-11 PROCEDURE — 77063 BREAST TOMOSYNTHESIS BI: CPT | Mod: 26

## 2022-02-11 PROCEDURE — 76641 ULTRASOUND BREAST COMPLETE: CPT | Mod: 26,50

## 2022-02-11 PROCEDURE — 76641 ULTRASOUND BREAST COMPLETE: CPT

## 2022-02-11 PROCEDURE — 77067 SCR MAMMO BI INCL CAD: CPT | Mod: 26

## 2022-02-11 PROCEDURE — 77063 BREAST TOMOSYNTHESIS BI: CPT

## 2022-02-28 ENCOUNTER — OUTPATIENT (OUTPATIENT)
Dept: OUTPATIENT SERVICES | Facility: HOSPITAL | Age: 57
LOS: 1 days | End: 2022-02-28
Payer: COMMERCIAL

## 2022-02-28 ENCOUNTER — APPOINTMENT (OUTPATIENT)
Dept: ULTRASOUND IMAGING | Facility: IMAGING CENTER | Age: 57
End: 2022-02-28
Payer: COMMERCIAL

## 2022-02-28 DIAGNOSIS — Z00.8 ENCOUNTER FOR OTHER GENERAL EXAMINATION: ICD-10-CM

## 2022-02-28 PROCEDURE — 76642 ULTRASOUND BREAST LIMITED: CPT

## 2022-02-28 PROCEDURE — 76642 ULTRASOUND BREAST LIMITED: CPT | Mod: 26

## 2022-03-09 ENCOUNTER — APPOINTMENT (OUTPATIENT)
Dept: ORTHOPEDIC SURGERY | Facility: CLINIC | Age: 57
End: 2022-03-09
Payer: COMMERCIAL

## 2022-03-09 PROCEDURE — 99213 OFFICE O/P EST LOW 20 MIN: CPT

## 2022-03-09 NOTE — DISCUSSION/SUMMARY
[de-identified] : I went over the pathophysiology of the patient's symptoms in great detail with the patient. I informed her she can have viscosupplementation every 6 months. She is due in the right knee after 06/20/22 and left after 06/29/22. If her knees become worse before that we could give her a cortisone shot. We discussed the use of ice, Tylenol and anti-inflammatories to relieve pain. She should keep her legs strong and her weight down. She should follow-up in 3 months. All of her questions were answered. She understands and consents to the plan.\par \par FU 3 months.

## 2022-03-09 NOTE — ADDENDUM
[FreeTextEntry1] : I, Jc Craig, acted solely as a scribe for Dr. Harley Vance on this date 03/09/2022.\par All medical record entries made by the Scribe were at my, Dr. Harley Vance, direction and personally dictated by me on 03/09/2022. I have reviewed the chart and agree that the record accurately reflects my personal performance of the history, physical exam, assessment and plan. I have also personally directed, reviewed, and agreed with the chart.

## 2022-03-09 NOTE — PHYSICAL EXAM
[de-identified] : Constitutional\par o Appearance : well-nourished, well developed, alert, in no acute distress \par Head and Face\par o Head :\par ¦ Inspection : atraumatic, normocephalic\par o Face :\par ¦ Inspection : no visible rash or discoloration\par Respiratory\par o Respiratory Effort: breathing unlabored \par Neurologic\par o Mental Status Examination :\par ¦ Orientation : grossly oriented to person, place and time\par Psychiatric\par o Mood and Affect: mood normal, affect appropriate \par \par Right Lower Extremity\par o Buttock : no tenderness, swelling or deformities \par o Right Hip :\par ¦ Inspection/Palpation : no tenderness, swelling or deformities\par ¦ Range of Motion : full and painless in all planes, no crepitance\par ¦ Stability : joint stability intact\par ¦ Strength : extension, flexion, adduction, abduction, internal rotation and external rotation, 5/5 \par \par o Right Knee :\par ¦ Inspection/Palpation : no medial or lateral compartment tenderness to palpation, no peripatellar tenderness, no swelling\par ¦ Range of Motion : FROM, no discomfort with full flexion, decreased patellofemoral glide with crepitance\par ¦ Stability : no valgus or varus instability present on provocative testing\par ¦ Strength : flexion and extension 5/5\par ¦ Tests and Signs : Anterior Drawer negative, Lachman negative, Therese's negative\par \par Left Lower Extremity\par o Buttock : no tenderness, swelling or deformities \par o Left Hip :\par ¦ Inspection/Palpation : no tenderness, no swelling or deformities\par ¦ Range of Motion : full and painless in all planes, no crepitance\par ¦ Stability : joint stability intact\par ¦ Strength : extension, flexion, adduction, abduction, internal rotation and external rotation, 5/5\par \par o Left Knee :\par ¦ Inspection/Palpation :  mild posteromedial joint line tenderness, no lateral compartment tenderness, no peripatellar tenderness, no swelling\par ¦ Range of Motion : 0-135°, no crepitance, decreased patellofemoral glide\par ¦ Stability : no valgus or varus instability present on provocative testing\par ¦ Strength : flexion and extension 5/5\par ¦ Tests and Signs : Anterior Drawer negative, Lachman negative, Therese's negative\par \par Gait: varus deformity bilaterally, no significant extremity swelling or lymphedema, excellent core strength

## 2022-03-09 NOTE — HISTORY OF PRESENT ILLNESS
[de-identified] : 56 year old female presents complaining of bilateral knee pain, left worse than right. Her discomfort is a 1-2/10 on average. It is aggravated by pivoting and twisting. She notes discomfort with step climbing and dancing. She has no pain walking on flat ground. She denies buckling or swelling. She received Durolane in the right knee on 12/20/21 and left on 12/29/21. She notes her knees felt good after these injections. She went skiing 6 days in a row. She wore a knee brace and took Aleve prophylactically. Her knees felt tired after skiing and feel better now.\par \par Radiology Results taken on 12/06/2021:\par o Right Knee : Standing AP, lateral and tunnel views of the knee were obtained and revealed bone on bone in the medial compartment with moderate patellofemoral arthritis. \par o Left Knee : Standing AP, lateral and tunnel views of the knee were obtained and revealed almost bone on bone in the medial compartment with moderate patellofemoral arthritis.

## 2022-03-14 ENCOUNTER — APPOINTMENT (OUTPATIENT)
Dept: ULTRASOUND IMAGING | Facility: IMAGING CENTER | Age: 57
End: 2022-03-14
Payer: COMMERCIAL

## 2022-03-14 ENCOUNTER — RESULT REVIEW (OUTPATIENT)
Age: 57
End: 2022-03-14

## 2022-03-14 ENCOUNTER — OUTPATIENT (OUTPATIENT)
Dept: OUTPATIENT SERVICES | Facility: HOSPITAL | Age: 57
LOS: 1 days | End: 2022-03-14
Payer: COMMERCIAL

## 2022-03-14 DIAGNOSIS — Z00.8 ENCOUNTER FOR OTHER GENERAL EXAMINATION: ICD-10-CM

## 2022-03-14 PROCEDURE — 19083 BX BREAST 1ST LESION US IMAG: CPT | Mod: RT

## 2022-03-14 PROCEDURE — 77065 DX MAMMO INCL CAD UNI: CPT | Mod: 26,RT

## 2022-03-14 PROCEDURE — A4648: CPT

## 2022-03-14 PROCEDURE — 19083 BX BREAST 1ST LESION US IMAG: CPT

## 2022-03-14 PROCEDURE — 88305 TISSUE EXAM BY PATHOLOGIST: CPT

## 2022-03-14 PROCEDURE — 77065 DX MAMMO INCL CAD UNI: CPT

## 2022-03-14 PROCEDURE — 88305 TISSUE EXAM BY PATHOLOGIST: CPT | Mod: 26

## 2022-06-21 NOTE — PHYSICAL EXAM
[de-identified] : Constitutional\par o Appearance : well-nourished, well developed, alert, in no acute distress \par Head and Face\par o Head :\par ¦ Inspection : atraumatic, normocephalic\par o Face :\par ¦ Inspection : no visible rash or discoloration\par Respiratory\par o Respiratory Effort: breathing unlabored \par Neurologic\par o Mental Status Examination :\par ¦ Orientation : grossly oriented to person, place and time\par Psychiatric\par o Mood and Affect: mood normal, affect appropriate \par \par Right Lower Extremity\par o Buttock : no tenderness, swelling or deformities \par o Right Hip :\par ¦ Inspection/Palpation : no tenderness, swelling or deformities\par ¦ Range of Motion : full and painless in all planes, no crepitance\par ¦ Stability : joint stability intact\par ¦ Strength : extension, flexion, adduction, abduction, internal rotation and external rotation, 5/5 \par \par o Right Knee :\par ¦ Inspection/Palpation : mild medial compartment tenderness to palpation, no swelling\par ¦ Range of Motion : 5-120° without pain, no crepitance, decreased patellofemoral glide\par ¦ Stability : no valgus or varus instability present on provocative testing\par ¦ Strength : flexion and extension 5/5\par ¦ Tests and Signs : Anterior Drawer negative, Lachman negative, Therese's negative\par \par Left Lower Extremity\par o Buttock : no tenderness, swelling or deformities \par o Left Hip :\par ¦ Inspection/Palpation : no tenderness, no swelling or deformities\par ¦ Range of Motion : full and painless in all planes, no crepitance\par ¦ Stability : joint stability intact\par ¦ Strength : extension, flexion, adduction, abduction, internal rotation and external rotation, 5/5\par \par o Left Knee :\par ¦ Inspection/Palpation : mild medial compartment tenderness, lateral compartment tenderness, mild swelling\par ¦ Range of Motion : 0-135°, no crepitance, decreased patellofemoral glide\par ¦ Stability : no valgus or varus instability present on provocative testing\par ¦ Strength : flexion and extension 5/5\par ¦ Tests and Signs : Anterior Drawer negative, Lachman negative, Therese's negative\par \par Gait: varus deformity bilaterally, no significant extremity swelling or lymphedema, excellent core strength\par \par Radiology Results:\par o Right Knee : Standing AP, lateral and tunnel views of the knee were obtained and revealed bone on bone in the medial compartment with moderate patellofemoral arthritis. \par o Left Knee : Standing AP, lateral and tunnel views of the knee were obtained and revealed almost bone on bone in the medial compartment with moderate patellofemoral arthritis. 
Detail Level: Detailed
Plan: Patient states she has been on spironolactone in the past as well and this has helped.  Plan to resume Humira, lab slip given today.  Follow up pending Humira approval.  Patient was doing injections at home, will demonstrate x1 once approved and then continue at home
Initiate Treatment: clindamycin phosphate 1 % topical solution, apply to affected areas twice daily\\n\\nbenzoyl peroxide 10 % topical cleanser, wash affected areas daily. \\nLet sit on skin for 2-3 minutes before rinsing. Rinse well\\n\\nspironolactone 50 mg tablet, take 1 tab by mouth twice daily\\n\\ndoxycycline hyclate 100 mg tablet, take one tablet bid for 30 days

## 2022-06-29 ENCOUNTER — APPOINTMENT (OUTPATIENT)
Dept: ORTHOPEDIC SURGERY | Facility: CLINIC | Age: 57
End: 2022-06-29
Payer: COMMERCIAL

## 2022-06-29 PROCEDURE — 99214 OFFICE O/P EST MOD 30 MIN: CPT

## 2022-06-29 RX ORDER — HYALURONATE SODIUM, STABILIZED 60 MG/3 ML
60 SYRINGE (ML) INTRAARTICULAR
Qty: 2 | Refills: 0 | Status: ACTIVE | COMMUNITY
Start: 2022-06-29

## 2022-06-29 NOTE — DISCUSSION/SUMMARY
[de-identified] : I went over the pathophysiology of the patient's symptoms in great detail with the patient. \par \par In terms of her knees: Viscosupplementation was discussed as a solution to the patient's symptoms and we are requesting authorization for Durolane for both knees. \par \par In terms of her left shoulder: We are requesting authorization for an MRI of the patient's left shoulder to r/o biceps tenosynovitis and labral tear. A prescription was provided. Patient will follow-up to review the results. \par  \par All of her questions were answered. She understands and consents to the plan.\par \par FU after viscosupplementation authorization.\par after a left shoulder MRI. Statement Selected

## 2022-06-29 NOTE — PHYSICAL EXAM
[de-identified] : Constitutional\par o Appearance : well-nourished, well developed, alert, in no acute distress \par Head and Face\par o Head :\par ¦ Inspection : atraumatic, normocephalic\par o Face :\par ¦ Inspection : no visible rash or discoloration\par Respiratory\par o Respiratory Effort: breathing unlabored \par Neurologic\par o Mental Status Examination :\par ¦ Orientation : grossly oriented to person, place and time\par Psychiatric\par o Mood and Affect: mood normal, affect appropriate \par \par Right Upper Extremity \par o Right Shoulder :\par ¦ Inspection/Palpation : no tenderness, no swelling or deformities\par ¦ Range of Motion : full and painless in all planes, no crepitance\par ¦ Strength : forward elevation, internal rotation 5/5, external rotation 5/5, external rotation at 90° of abduction, supraspinatus, adduction and abduction, biceps/triceps, 5/5\par ¦ Stability : no joint instability on provocative testing \par ¦ Tests: Saul negative, Neer negative, Marbella negative, negative drop arm test , [place]'s test negative \par \par Left Upper Extremity\par o Left Shoulder :\par ¦ Inspection/Palpation : no anterior capsular or biceps tenderness, no swelling or deformities\par ¦ Range of Motion : full and painless in all planes, no crepitance\par ¦ Strength :  forward elevation, internal rotation 5/5, external rotation 5/5, external rotation at 90° of abduction, supraspinatus, adduction and abduction, biceps/triceps, 5/5\par ¦ Stability : no joint instability on provocative testing\par ¦ Tests: Saul positive, Neer negative, Marbella positive, negative drop arm test, positive [place]'s test \par \par Right Lower Extremity\par o Buttock : no tenderness, swelling or deformities \par o Right Hip :\par ¦ Inspection/Palpation : no tenderness, swelling or deformities\par ¦ Range of Motion : full and painless in all planes, no crepitance\par ¦ Stability : joint stability intact\par ¦ Strength : extension, flexion, adduction, abduction, internal rotation and external rotation, 5/5 \par \par o Right Knee :\par ¦ Inspection/Palpation : medial compartment tenderness to palpation, no swelling\par ¦ Range of Motion : 3-120°, discomfort with full flexion, decreased patellofemoral glide with crepitance\par ¦ Stability : no valgus or varus instability present on provocative testing\par ¦ Strength : flexion and extension 5/5\par ¦ Tests and Signs : Anterior Drawer negative, Lachman negative, Therese's negative\par \par Left Lower Extremity\par o Buttock : no tenderness, swelling or deformities \par o Left Hip :\par ¦ Inspection/Palpation : no tenderness, no swelling or deformities\par ¦ Range of Motion : full and painless in all planes, no crepitance\par ¦ Stability : joint stability intact\par ¦ Strength : extension, flexion, adduction, abduction, internal rotation and external rotation, 5/5\par \par o Left Knee :\par ¦ Inspection/Palpation : medial compartment tenderness, no swelling\par ¦ Range of Motion : 0-135°, tightness with full flexion, no crepitance, decreased patellofemoral glide\par ¦ Stability : no valgus or varus instability present on provocative testing\par ¦ Strength : flexion and extension 5/5\par ¦ Tests and Signs : Anterior Drawer negative, Lachman negative, Therese's negative\par \par Gait: varus deformity bilaterally, no significant extremity swelling or lymphedema, excellent core strength

## 2022-06-29 NOTE — ADDENDUM
[FreeTextEntry1] : I, Jc Craig, acted solely as a scribe for Dr. Harley Vance on this date 06/29/2022.\par All medical record entries made by the Scribe were at my, Dr. Harley Vance, direction and personally dictated by me on 06/29/2022. I have reviewed the chart and agree that the record accurately reflects my personal performance of the history, physical exam, assessment and plan. I have also personally directed, reviewed, and agreed with the chart.

## 2022-06-29 NOTE — HISTORY OF PRESENT ILLNESS
[de-identified] : 57 year old female presents with bilateral knee pain. She received Durolane in both knees over 6 months ago (right 12/20/21, left 12/29/21). She noted significant relief from these injections. It has been wearing off and she feels more pain, especially for the last few days with the humid weather. She notices that she can bend the left knee more than the right knee. She denies buckling or swelling. She is interested in repeat gel shots. She also complains of left shoulder pain over the biceps. She is left-hand dominant. She saw Dr. Mack for her shoulder in January; he took x-rays and sent her to PT. It was feeling better until recently. She thinks maybe her training was too extensive.\par \par X-rays done with Dr. Mack on 01/06/2022 - AP, Y, and axillary views were obtained and revealed mild degenerative arthritis of the glenohumeral joint and AC joints with a downsloping acromion. \par \par Radiology Results 12/06/2021:\par o Right Knee : Standing AP, lateral and tunnel views of the knee were obtained and revealed bone on bone in the medial compartment with moderate patellofemoral arthritis. \par o Left Knee : Standing AP, lateral and tunnel views of the knee were obtained and revealed almost bone on bone in the medial compartment with moderate patellofemoral arthritis.

## 2022-07-01 ENCOUNTER — NON-APPOINTMENT (OUTPATIENT)
Age: 57
End: 2022-07-01

## 2022-07-06 ENCOUNTER — APPOINTMENT (OUTPATIENT)
Dept: ORTHOPEDIC SURGERY | Facility: CLINIC | Age: 57
End: 2022-07-06

## 2022-07-06 PROCEDURE — 20611 DRAIN/INJ JOINT/BURSA W/US: CPT | Mod: LT

## 2022-07-06 PROCEDURE — 99213 OFFICE O/P EST LOW 20 MIN: CPT | Mod: 25

## 2022-07-06 NOTE — ADDENDUM
[FreeTextEntry1] : I, Jc Craig, acted solely as a scribe for Dr. Harley Vnace on this date 07/06/2022.\par All medical record entries made by the Scribe were at my, Dr. Harley Vance, direction and personally dictated by me on 07/06/2022. I have reviewed the chart and agree that the record accurately reflects my personal performance of the history, physical exam, assessment and plan. I have also personally directed, reviewed, and agreed with the chart.
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
## 2022-07-06 NOTE — PHYSICAL EXAM
[de-identified] : Constitutional\par o Appearance : well-nourished, well developed, alert, in no acute distress \par Head and Face\par o Head :\par ¦ Inspection : atraumatic, normocephalic\par o Face :\par ¦ Inspection : no visible rash or discoloration\par Respiratory\par o Respiratory Effort: breathing unlabored \par Neurologic\par o Mental Status Examination :\par ¦ Orientation : grossly oriented to person, place and time\par Psychiatric\par o Mood and Affect: mood normal, affect appropriate \par \par Right Upper Extremity \par o Right Shoulder :\par ¦ Inspection/Palpation : no tenderness, no swelling or deformities\par ¦ Range of Motion : full and painless in all planes, no crepitance\par ¦ Strength : forward elevation, internal rotation 5/5, external rotation 5/5, external rotation at 90° of abduction, supraspinatus, adduction and abduction, biceps/triceps, 5/5\par ¦ Stability : no joint instability on provocative testing \par ¦ Tests: Saul negative, Neer negative, Marbella negative, negative drop arm test , [place]'s test negative \par \par Left Upper Extremity\par o Left Shoulder :\par ¦ Inspection/Palpation : no anterior capsular or biceps tenderness, no swelling or deformities\par ¦ Range of Motion : full and painless in all planes, no crepitance\par ¦ Strength :  forward elevation, internal rotation 5/5, external rotation 5/5, external rotation at 90° of abduction, supraspinatus, adduction and abduction, biceps/triceps, 5/5\par ¦ Stability : no joint instability on provocative testing\par ¦ Tests: Saul positive, Neer negative, Marbella positive, negative drop arm test, positive [place]'s test \par \par Right Lower Extremity\par o Buttock : no tenderness, swelling or deformities \par o Right Hip :\par ¦ Inspection/Palpation : no tenderness, swelling or deformities\par ¦ Range of Motion : full and painless in all planes, no crepitance\par ¦ Stability : joint stability intact\par ¦ Strength : extension, flexion, adduction, abduction, internal rotation and external rotation, 5/5 \par \par o Right Knee :\par ¦ Inspection/Palpation : medial compartment tenderness to palpation, no swelling\par ¦ Range of Motion : 3-120°, discomfort with full flexion, decreased patellofemoral glide with crepitance\par ¦ Stability : no valgus or varus instability present on provocative testing\par ¦ Strength : flexion and extension 5/5\par ¦ Tests and Signs : Anterior Drawer negative, Lachman negative, Therese's negative\par \par Left Lower Extremity\par o Buttock : no tenderness, swelling or deformities \par o Left Hip :\par ¦ Inspection/Palpation : no tenderness, no swelling or deformities\par ¦ Range of Motion : full and painless in all planes, no crepitance\par ¦ Stability : joint stability intact\par ¦ Strength : extension, flexion, adduction, abduction, internal rotation and external rotation, 5/5\par \par o Left Knee :\par ¦ Inspection/Palpation : medial compartment tenderness, no swelling\par ¦ Range of Motion : 0-135°, no crepitance, good patellofemoral glide\par ¦ Stability : no valgus or varus instability present on provocative testing\par ¦ Strength : flexion and extension 5/5\par ¦ Tests and Signs : Anterior Drawer negative, Lachman negative, Therese's negative\par \par Gait: varus deformity bilaterally, no significant extremity swelling or lymphedema, excellent core strength\par \par o Left Knee injection : Indication- knee osteoarthritis, Anatomic location- left intra-articular joint space, Spray - area was sterilized with Betadine and alcohol and anesthetized with Ethyl Chloride, needle used-20G, Medications given- 3cc's Durolane under Ultrasound guidance. The patient tolerated the procedure well.  [facility]#: [id number]   oExp: [id number]

## 2022-07-06 NOTE — DISCUSSION/SUMMARY
[de-identified] : I went over the pathophysiology of the patient's symptoms in great detail with the patient. The patient elected to receive a Durolane injection into her knee today, and tolerated it well. I instructed the pt on ROM exercises, and told them to take it easy. The use of ice and rest was reviewed with the patient. The patient may resume activities tomorrow. I reminded the patient that it takes 4 to 6 weeks after the injection to feel symptom relief. All of her questions were answered. She understands and consents to the plan.\par \par FU next week for the right knee Durolane injection.\par after a left knee Durolane injection today (07/06/2022).

## 2022-07-13 ENCOUNTER — APPOINTMENT (OUTPATIENT)
Dept: ORTHOPEDIC SURGERY | Facility: CLINIC | Age: 57
End: 2022-07-13

## 2022-07-13 PROCEDURE — 20611 DRAIN/INJ JOINT/BURSA W/US: CPT | Mod: 59,RT

## 2022-07-13 PROCEDURE — 99214 OFFICE O/P EST MOD 30 MIN: CPT | Mod: 25

## 2022-07-13 NOTE — PHYSICAL EXAM
[de-identified] : Constitutional\par o Appearance : well-nourished, well developed, alert, in no acute distress \par Head and Face\par o Head :\par ¦ Inspection : atraumatic, normocephalic\par o Face :\par ¦ Inspection : no visible rash or discoloration\par Respiratory\par o Respiratory Effort: breathing unlabored \par Neurologic\par o Mental Status Examination :\par ¦ Orientation : grossly oriented to person, place and time\par Psychiatric\par o Mood and Affect: mood normal, affect appropriate \par \par Right Upper Extremity \par o Right Shoulder :\par ¦ Inspection/Palpation : no tenderness, no swelling or deformities\par ¦ Range of Motion : full and painless in all planes, no crepitance\par ¦ Strength : forward elevation, internal rotation 5/5, external rotation 5/5, external rotation at 90° of abduction, supraspinatus, adduction and abduction, biceps/triceps, 5/5\par ¦ Stability : no joint instability on provocative testing \par ¦ Tests: Saul negative, Neer negative, Marbella negative, negative drop arm test , [place]'s test negative \par \par Left Upper Extremity\par o Left Shoulder :\par ¦ Inspection/Palpation : no anterior capsular or biceps tenderness, no swelling or deformities\par ¦ Range of Motion : full and painless in all planes, no crepitance\par ¦ Strength :  forward elevation, internal rotation 5/5, external rotation 5/5, external rotation at 90° of abduction, supraspinatus, adduction and abduction, biceps/triceps, 5/5\par ¦ Stability : no joint instability on provocative testing\par ¦ Tests: Saul positive, Neer negative, Marbella positive, negative drop arm test, positive [place]'s test \par \par Right Lower Extremity\par o Buttock : no tenderness, swelling or deformities \par o Right Hip :\par ¦ Inspection/Palpation : no tenderness, swelling or deformities\par ¦ Range of Motion : full and painless in all planes, no crepitance\par ¦ Stability : joint stability intact\par ¦ Strength : extension, flexion, adduction, abduction, internal rotation and external rotation, 5/5 \par \par o Right Knee :\par ¦ Inspection/Palpation : medial compartment tenderness to palpation, no swelling\par ¦ Range of Motion : 3-120°, discomfort with full flexion, decreased patellofemoral glide with crepitance\par ¦ Stability : no valgus or varus instability present on provocative testing\par ¦ Strength : flexion and extension 5/5\par ¦ Tests and Signs : Anterior Drawer negative, Lachman negative, Therese's negative\par \par Left Lower Extremity\par o Buttock : no tenderness, swelling or deformities \par o Left Hip :\par ¦ Inspection/Palpation : no tenderness, no swelling or deformities\par ¦ Range of Motion : full and painless in all planes, no crepitance\par ¦ Stability : joint stability intact\par ¦ Strength : extension, flexion, adduction, abduction, internal rotation and external rotation, 5/5\par \par o Left Knee :\par ¦ Inspection/Palpation : medial compartment tenderness, no swelling\par ¦ Range of Motion : 0-135°, no crepitance, good patellofemoral glide\par ¦ Stability : no valgus or varus instability present on provocative testing\par ¦ Strength : flexion and extension 5/5\par ¦ Tests and Signs : Anterior Drawer negative, Lachman negative, Therese's negative\par \par Gait: varus deformity bilaterally, no significant extremity swelling or lymphedema, excellent core strength\par \par o Left Shoulder: Indication- shoulder arthritis, Anatomic location- left intraarticular glenohumeral joint, Spray-area was sterilized with Betadine and alcohol and anesthetized with Ethyl Chloride , Needle used - 20G, Medications given- 5cc's lidocaine, 0.5cc's kenalog, 0.5cc's dexamethasone under Ultrasound guidance, and patient tolerated it well. \par \par o Right Knee injection : Indication- knee osteoarthritis, Anatomic location- right intra-articular joint space, Spray - area was sterilized with Betadine and alcohol and anesthetized with Ethyl Chloride, needle used-20G, Medications given- 3cc's Durolane under Ultrasound guidance. The patient tolerated the procedure well.  [facility]#: [id number]   oExp: 2024-12-31

## 2022-07-13 NOTE — DISCUSSION/SUMMARY
[de-identified] : I went over the pathophysiology of the patient's symptoms in great detail with the patient. \par \par In terms of the right knee: The patient elected to receive a Durolane injection into her knee today, and tolerated it well. I instructed the pt on ROM exercises, and told them to take it easy. The use of ice and rest was reviewed with the patient. The patient may resume activities on Friday. I reminded the patient that it takes 4 to 6 weeks after the injection to feel symptom relief. \par \par In terms of the left shoulder: I went over the patient's MRI with them in great detail and used models to aid in my explanation. A discussion ensued about conservative treatment options (one cortisone injection) versus surgical intervention (an arthroscopy). The patient elected to receive a cortisone injection into her left shoulder today and tolerated it well. I instructed the patient on ROM exercises and told them to take it easy. The use of ice and rest was reviewed with the patient. The patient may resume activities on Friday.\par \par I would like to see the patient back in the office in 1 month to reassess her condition. If she does not respond to the cortisone injection in her shoulder then an arthroscopy would be considered. All of her questions were answered. She understands and consents to the plan.\par \par FU in 1 month.\par after a right knee Durolane injection today (07/13/2022). \par after an intra-articular cortisone injection in the left shoulder today (07/13/2022).

## 2022-07-13 NOTE — HISTORY OF PRESENT ILLNESS
[de-identified] : 57 year old female presents with bilateral knee pain. She notices that she can bend the left knee more than the right knee. She denies buckling or swelling. She is interested in repeat gel shots. She received a left knee Durolane injection on 07/02/22. She presents for a right knee Durolane injection today (07/13/2022). She has been biking outside and falling. \par \par She also complains of left shoulder pain over the biceps. She is left-hand dominant. She saw Dr. Mack for her shoulder in January; he took x-rays and sent her to PT. It was feeling better until recently. She thinks maybe her training was too extensive. She notes difficulty with full internal rotation and limitation in weight lifting due to pain. She presents today to review her MRI results. She does not recall a recent fall onto the left shoulder.\par \par MRI Left Shoulder obtained at [facility] on 7/8/2022:\par 1.  Small intra-articular impaction injury with focal subchondral edema and cystic change seen at the superior lateral humeral head. \par 2.  Degenerative changes the superior labrum with no focal labral tears.\par 3.  Moderate to severe glenohumeral osteoarthritis, joint effusion with synovitis. 0.9 cm intra-articular body is located in the subscapularis recess. \par 4.  Supraspinatus and infraspinatus tendinosis. No visualized rotator cuff tendon tears.\par 5.  Mild subacromial bursitis is demonstrated. \par 6.  Mild osteoarthritis of the acromioclavicular joint. \par * Long bicipital tendon: Maintained within the bicipital groove without subluxation or dislocation. No tendinosis or tear.\par \par X-rays done with Dr. Mack on 01/06/2022 - AP, Y, and axillary views were obtained and revealed mild degenerative arthritis of the glenohumeral joint and AC joints with a downsloping acromion. \par \par Radiology Results 12/06/2021:\par o Right Knee : Standing AP, lateral and tunnel views of the knee were obtained and revealed bone on bone in the medial compartment with moderate patellofemoral arthritis. \par o Left Knee : Standing AP, lateral and tunnel views of the knee were obtained and revealed almost bone on bone in the medial compartment with moderate patellofemoral arthritis.

## 2022-07-13 NOTE — ADDENDUM
[FreeTextEntry1] : I, Jc Craig, acted solely as a scribe for Dr. Harley Vance on this date 07/13/2022.\par All medical record entries made by the Scribe were at my, Dr. Harley Vance, direction and personally dictated by me on 07/13/2022. I have reviewed the chart and agree that the record accurately reflects my personal performance of the history, physical exam, assessment and plan. I have also personally directed, reviewed, and agreed with the chart.

## 2022-09-21 ENCOUNTER — APPOINTMENT (OUTPATIENT)
Dept: ORTHOPEDIC SURGERY | Facility: CLINIC | Age: 57
End: 2022-09-21

## 2022-09-21 DIAGNOSIS — S76.111D STRAIN OF RIGHT QUADRICEPS MUSCLE, FASCIA AND TENDON, SUBSEQUENT ENCOUNTER: ICD-10-CM

## 2022-09-21 PROCEDURE — 99214 OFFICE O/P EST MOD 30 MIN: CPT

## 2022-09-21 NOTE — DISCUSSION/SUMMARY
[de-identified] : I went over the pathophysiology of the patient's symptoms in great detail with them. I stressed the importance of continued weight loss and its benefits to the patient’s joints and overall health. I informed her there is nothing to do about the defect in her right quadriceps. I informed her that she is entitled to gel injections every 6 months (due in left knee after 1/2/23 and right knee 1/13/23) and that we would only give her another shoulder injection if it bothers her again. She should avoid lifting anything overhead or straight overhead. I would like to see the patient back in the office in 2 months to reassess her condition and request gel shots. All of their questions were answered. They understand and consent to the plan.\par \par FU in 2 months.

## 2022-09-21 NOTE — PHYSICAL EXAM
[de-identified] : Constitutional\par o Appearance : well-nourished, well developed, alert, in no acute distress \par Head and Face\par o Head :\par ¦ Inspection : atraumatic, normocephalic\par o Face :\par ¦ Inspection : no visible rash or discoloration\par Respiratory\par o Respiratory Effort: breathing unlabored \par Neurologic\par o Mental Status Examination :\par ¦ Orientation : grossly oriented to person, place and time\par Psychiatric\par o Mood and Affect: mood normal, affect appropriate \par \par Right Upper Extremity \par o Right Shoulder :\par ¦ Inspection/Palpation : no tenderness, no swelling or deformities\par ¦ Range of Motion : full and painless in all planes, no crepitance\par ¦ Strength : forward elevation, internal rotation 5/5, external rotation 5/5, external rotation at 90° of abduction, supraspinatus, adduction and abduction, biceps/triceps, 5/5\par ¦ Stability : no joint instability on provocative testing \par ¦ Tests: Saul negative, Neer negative, Marbella negative, negative drop arm test , [place]'s test negative \par \par Left Upper Extremity\par o Left Shoulder :\par ¦ Inspection/Palpation : no anterior capsular tenderness, no swelling or deformities\par ¦ Range of Motion : full forward flexion, symmetrical internal rotation, full external rotation, no crepitance\par ¦ Strength : forward elevation 5/5, internal rotation 5/5, external rotation 5/5, external rotation at 90° of abduction 5/5, supraspinatus 5/5, adduction and abduction 5/5, biceps/triceps 5/5 \par ¦ Stability : no joint instability on provocative testing\par ¦ Tests: Saul negative, Neer negative, Marbella negative, negative drop arm test, [place]'s test negative \par \par Right Lower Extremity\par o Buttock : no tenderness, swelling or deformities \par o Right Hip :\par ¦ Inspection/Palpation : no tenderness, swelling or deformities\par ¦ Range of Motion : full and painless in all planes, no crepitance\par ¦ Stability : joint stability intact\par ¦ Strength : extension, flexion, adduction, abduction, internal rotation and external rotation, 5/5 \par \par o Right Knee :\par ¦ Inspection/Palpation : medial compartment tenderness to palpation, no swelling, defect in the quadriceps muscle\par ¦ Range of Motion : 0-125°, decreased patellofemoral glide with crepitance\par ¦ Stability : no valgus or varus instability present on provocative testing\par ¦ Strength : flexion and extension 5/5\par ¦ Tests and Signs : Anterior Drawer negative, Lachman negative, Therese's negative\par \par Left Lower Extremity\par o Buttock : no tenderness, swelling or deformities \par o Left Hip :\par ¦ Inspection/Palpation : no tenderness, no swelling or deformities\par ¦ Range of Motion : full and painless in all planes, no crepitance\par ¦ Stability : joint stability intact\par ¦ Strength : extension, flexion, adduction, abduction, internal rotation and external rotation, 5/5\par \par o Left Knee :\par ¦ Inspection/Palpation : medial compartment tenderness, no swelling\par ¦ Range of Motion : 0-130°, no crepitance, good patellofemoral glide\par ¦ Stability : no valgus or varus instability present on provocative testing\par ¦ Strength : flexion and extension 5/5\par ¦ Tests and Signs : Anterior Drawer negative, Lachman negative, Therese's negative\par \par Gait: varus deformity bilaterally, no significant extremity swelling or lymphedema, excellent core strength

## 2022-09-21 NOTE — ADDENDUM
[FreeTextEntry1] : I, Jc Craig, acted solely as a scribe for Dr. Harley Vance on this date 09/21/2022.\par All medical record entries made by the Scribe were at my, Dr. Harley Vance, direction and personally dictated by me on 09/21/2022. I have reviewed the chart and agree that the record accurately reflects my personal performance of the history, physical exam, assessment and plan. I have also personally directed, reviewed, and agreed with the chart.

## 2022-09-21 NOTE — HISTORY OF PRESENT ILLNESS
[de-identified] : 57 year old LHD female presents for re-evaluation s/p bilateral knee Durolane injections (L 7/2/22; R 7/13/22) and left shoulder intraarticular cortisone injection on 7/13/22. She reports her knees feel 70-80% better and her shoulder feels 80% better. She notes improved shoulder ROM. She notices more flexion in the left knee than the right knee. She denies buckling or swelling of her knees. She has been biking outside.\par \par MRI Left Shoulder obtained at [facility] on 7/8/2022 revealed:\par 1.  Small intra-articular impaction injury with focal subchondral edema and cystic change seen at the superior lateral humeral head. \par 2.  Degenerative changes the superior labrum with no focal labral tears.\par 3.  Moderate to severe glenohumeral osteoarthritis, joint effusion with synovitis. 0.9 cm intra-articular body is located in the subscapularis recess. \par 4.  Supraspinatus and infraspinatus tendinosis. No visualized rotator cuff tendon tears.\par 5.  Mild subacromial bursitis is demonstrated. \par 6.  Mild osteoarthritis of the acromioclavicular joint. \par * Long bicipital tendon: Maintained within the bicipital groove without subluxation or dislocation. No tendinosis or tear.\par \par X-rays done with Dr. Mack on 01/06/2022 - AP, Y, and axillary views were obtained and revealed mild degenerative arthritis of the glenohumeral joint and AC joints with a downsloping acromion. \par \par Radiology Results 12/06/2021:\par o Right Knee : Standing AP, lateral and tunnel views of the knee were obtained and revealed bone on bone in the medial compartment with moderate patellofemoral arthritis. \par o Left Knee : Standing AP, lateral and tunnel views of the knee were obtained and revealed almost bone on bone in the medial compartment with moderate patellofemoral arthritis.

## 2022-11-28 ENCOUNTER — APPOINTMENT (OUTPATIENT)
Dept: ORTHOPEDIC SURGERY | Facility: CLINIC | Age: 57
End: 2022-11-28

## 2022-11-28 DIAGNOSIS — M65.819 OTHER SYNOVITIS AND TENOSYNOVITIS, UNSPECIFIED SHOULDER: ICD-10-CM

## 2022-11-28 PROCEDURE — 99214 OFFICE O/P EST MOD 30 MIN: CPT

## 2022-11-28 PROCEDURE — 73564 X-RAY EXAM KNEE 4 OR MORE: CPT | Mod: 50

## 2022-12-02 RX ORDER — HYALURONATE SODIUM, STABILIZED 88 MG/4 ML
88 SYRINGE (ML) INTRAARTICULAR
Qty: 2 | Refills: 0 | Status: ACTIVE | COMMUNITY
Start: 2022-12-02

## 2023-01-04 ENCOUNTER — APPOINTMENT (OUTPATIENT)
Dept: ORTHOPEDIC SURGERY | Facility: CLINIC | Age: 58
End: 2023-01-04
Payer: COMMERCIAL

## 2023-01-04 PROCEDURE — 20611 DRAIN/INJ JOINT/BURSA W/US: CPT | Mod: LT

## 2023-01-04 PROCEDURE — 99214 OFFICE O/P EST MOD 30 MIN: CPT | Mod: 25

## 2023-01-19 ENCOUNTER — APPOINTMENT (OUTPATIENT)
Dept: ORTHOPEDIC SURGERY | Facility: CLINIC | Age: 58
End: 2023-01-19
Payer: COMMERCIAL

## 2023-01-19 PROCEDURE — 20611 DRAIN/INJ JOINT/BURSA W/US: CPT | Mod: RT

## 2023-03-06 ENCOUNTER — APPOINTMENT (OUTPATIENT)
Dept: ORTHOPEDIC SURGERY | Facility: CLINIC | Age: 58
End: 2023-03-06

## 2023-03-09 ENCOUNTER — APPOINTMENT (OUTPATIENT)
Dept: ORTHOPEDIC SURGERY | Facility: CLINIC | Age: 58
End: 2023-03-09
Payer: COMMERCIAL

## 2023-03-09 PROCEDURE — 20611 DRAIN/INJ JOINT/BURSA W/US: CPT | Mod: LT

## 2023-03-09 PROCEDURE — 99214 OFFICE O/P EST MOD 30 MIN: CPT | Mod: 25

## 2023-03-31 ENCOUNTER — OUTPATIENT (OUTPATIENT)
Dept: OUTPATIENT SERVICES | Facility: HOSPITAL | Age: 58
LOS: 1 days | End: 2023-03-31
Payer: COMMERCIAL

## 2023-03-31 ENCOUNTER — APPOINTMENT (OUTPATIENT)
Dept: MAMMOGRAPHY | Facility: IMAGING CENTER | Age: 58
End: 2023-03-31
Payer: COMMERCIAL

## 2023-03-31 ENCOUNTER — APPOINTMENT (OUTPATIENT)
Dept: ULTRASOUND IMAGING | Facility: IMAGING CENTER | Age: 58
End: 2023-03-31
Payer: COMMERCIAL

## 2023-03-31 DIAGNOSIS — Z00.8 ENCOUNTER FOR OTHER GENERAL EXAMINATION: ICD-10-CM

## 2023-03-31 PROCEDURE — 77067 SCR MAMMO BI INCL CAD: CPT | Mod: 26

## 2023-03-31 PROCEDURE — 77063 BREAST TOMOSYNTHESIS BI: CPT

## 2023-03-31 PROCEDURE — 77067 SCR MAMMO BI INCL CAD: CPT

## 2023-03-31 PROCEDURE — 77063 BREAST TOMOSYNTHESIS BI: CPT | Mod: 26

## 2023-03-31 PROCEDURE — 77066 DX MAMMO INCL CAD BI: CPT

## 2023-03-31 PROCEDURE — G0279: CPT

## 2023-09-18 RX ORDER — HYALURONATE SODIUM, STABILIZED 88 MG/4 ML
88 SYRINGE (ML) INTRAARTICULAR
Qty: 2 | Refills: 0 | Status: ACTIVE | COMMUNITY
Start: 2023-09-18

## 2023-10-16 ENCOUNTER — APPOINTMENT (OUTPATIENT)
Dept: ORTHOPEDIC SURGERY | Facility: CLINIC | Age: 58
End: 2023-10-16
Payer: COMMERCIAL

## 2023-10-16 PROCEDURE — 73564 X-RAY EXAM KNEE 4 OR MORE: CPT | Mod: 50

## 2023-10-16 PROCEDURE — 99214 OFFICE O/P EST MOD 30 MIN: CPT | Mod: 25

## 2023-10-16 PROCEDURE — 20611 DRAIN/INJ JOINT/BURSA W/US: CPT | Mod: 50

## 2023-11-27 ENCOUNTER — APPOINTMENT (OUTPATIENT)
Dept: ORTHOPEDIC SURGERY | Facility: CLINIC | Age: 58
End: 2023-11-27
Payer: COMMERCIAL

## 2023-11-27 VITALS — WEIGHT: 155 LBS | BODY MASS INDEX: 24.91 KG/M2 | HEIGHT: 66 IN

## 2023-11-27 PROCEDURE — 99213 OFFICE O/P EST LOW 20 MIN: CPT

## 2024-03-04 ENCOUNTER — APPOINTMENT (OUTPATIENT)
Dept: ORTHOPEDIC SURGERY | Facility: CLINIC | Age: 59
End: 2024-03-04
Payer: COMMERCIAL

## 2024-03-04 DIAGNOSIS — M75.22 BICIPITAL TENDINITIS, LEFT SHOULDER: ICD-10-CM

## 2024-03-04 DIAGNOSIS — M75.42 IMPINGEMENT SYNDROME OF LEFT SHOULDER: ICD-10-CM

## 2024-03-04 DIAGNOSIS — M47.812 SPONDYLOSIS W/OUT MYELOPATHY OR RADICULOPATHY, CERVICAL REGION: ICD-10-CM

## 2024-03-04 PROCEDURE — 20611 DRAIN/INJ JOINT/BURSA W/US: CPT | Mod: LT

## 2024-03-04 PROCEDURE — 99214 OFFICE O/P EST MOD 30 MIN: CPT | Mod: 25

## 2024-03-04 PROCEDURE — 73030 X-RAY EXAM OF SHOULDER: CPT | Mod: LT

## 2024-03-04 RX ORDER — HYALURONATE SODIUM, STABILIZED 88 MG/4 ML
88 SYRINGE (ML) INTRAARTICULAR
Qty: 1 | Refills: 0 | Status: ACTIVE | COMMUNITY
Start: 2024-03-04

## 2024-03-04 NOTE — PHYSICAL EXAM
[de-identified] : Constitutional o Appearance : well-nourished, well developed, alert, in no acute distress  Head and Face o Head :  Inspection : atraumatic, normocephalic o Face :  Inspection : no visible rash or discoloration Respiratory o Respiratory Effort: breathing unlabored  Neurologic o Mental Status Examination :  Orientation : grossly oriented to person, place and time Psychiatric o Mood and Affect: mood normal, affect appropriate   Right Upper Extremity  o Right Shoulder :  Inspection/Palpation : no tenderness, no swelling or deformities  Range of Motion : full and painless in all planes, no crepitance  Strength : forward elevation, internal rotation 5/5, external rotation 5/5, external rotation at 90 of abduction, supraspinatus, adduction and abduction, biceps/triceps, 5/5  Stability : no joint instability on provocative testing   Tests: Saul negative, Neer negative, Marbella negative, negative drop arm test , [place]'s test negative   Left Upper Extremity o Left Shoulder :  Inspection/Palpation : mild anterior capsular tenderness, bicep tenderness no swelling or deformities  Range of Motion : full forward flexion, full ER, Full IR,  no crepitance  Strength : forward elevation 5/5, internal rotation 5/5, external rotation 5/5, external rotation at 90 of abduction 5/5, supraspinatus 5/5, adduction and abduction 5/5, biceps/triceps 5/5   Stability : no joint instability on provocative testing  Tests: Saul positive Neer negative, Marbella positive, negative drop arm test, [place]'s test positive  Right Lower Extremity o Buttock : no tenderness, swelling or deformities  o Right Hip :  Inspection/Palpation : no tenderness, swelling or deformities  Range of Motion : full and painless in all planes, no crepitance  Stability : joint stability intact  Strength : extension, flexion, adduction, abduction, internal rotation and external rotation, 5/5   o Right Knee :  Inspection/Palpation : no medial or lateral compartment tenderness to palpation, no swelling, defect in the quadriceps muscle  Range of Motion : 0-140, decreased patellofemoral glide with crepitance  Stability : mild valgus or varus instability present on provocative testing  Strength : flexion and extension 5/5  Tests and Signs : Anterior Drawer negative, Lachman negative, Therese's negative  Left Lower Extremity o Buttock : no tenderness, swelling or deformities  o Left Hip :  Inspection/Palpation : no tenderness, no swelling or deformities  Range of Motion : full and painless in all planes, no crepitance  Stability : joint stability intact  Strength : extension, flexion, adduction, abduction, internal rotation and external rotation, 5/5  o Left Knee :  Inspection/Palpation : no medial or lateral compartment tenderness, no medial or lateral facet tenderness, no swelling  Range of Motion : 0-140, no crepitance, decreased patellofemoral glide  Stability : no valgus or varus instability present on provocative testing  Strength : flexion and extension 5/5  Tests and Signs : Anterior Drawer negative, Lachman negative, Therese's negative  Gait: varus deformity bilaterally, no significant extremity swelling or lymphedema, excellent core strength  Radiology Results 3/4/2024  o Left Shoulder : AP internal/external rotation and outlet views were obtained and revealed a downsloping acromion with moderate degenerative arthritis  of the glenohumeral joint no lytic lesions slight increased arthritis of Jan 2022  o Shoulder: Indication- left shoulder arthritis, Anatomic location left intra-articular joint, Spray-area was sterilized with Betadine and alcohol and anesthetized with Ethyl Chloride , Needle used - 22G, Medications given- 5cc's lidocaine, 0.5cc's kenalog, 0.5cc's dexamethasone. The patient tolerated the procedure well. This was done under Ultrasound guidance. All her tests reverted to normal without pain.

## 2024-03-04 NOTE — DISCUSSION/SUMMARY
[de-identified] : I went over the pathophysiology of the patient's symptoms in great detail with the patient. I discussed the underlying pathophysiology of the patient's condition in great detail with the patient. I went over the patient's x-rays with them in great detail. The patient elected to receive a cortisone injection into her left shoulder today, and tolerated it well. I instructed the patient on ROM exercises, and told them to take it easy. The use of ice and rest was reviewed with the patient. The patient may resume activities tomorrow.   All of their questions were answered. They understand and consent to the plan.   of her cervical spine  and request Durolane for both knees upon her return.

## 2024-03-04 NOTE — ADDENDUM
[FreeTextEntry1] : I, AUTUMN CURRY, acted solely as a scribe for Dr. Harley Vance on this date 03/04/2024.  All medical record entries made by the Scribe were at my, Dr. Harley Vance, direction and personally dictated by me on 03/04/2024. I have reviewed the chart and agree that the record accurately reflects my personal performance of the history, physical exam, assessment and plan. I have also personally directed, reviewed, and agreed with the chart.

## 2024-03-04 NOTE — HISTORY OF PRESENT ILLNESS
[de-identified] : 58-year-old LHD female presents with left shoulder pain that radiates into the bicep. She states she neck pain as well but states those are two separate issues. She is interested in a left shoulder cortisone injection today. Patient denies that she has any numbness or tingling. 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 She had bilateral Durolane injections on 10/16/2023 and reports no problems.   Radiology Results 10/16/2023 o Right Knee : Standing AP, lateral and tunnel views of the knee were obtained and revealed bone on bone in the medial compartment with moderate patellofemoral arthritis  o Left Knee : Standing AP, lateral and tunnel views of the knee were obtained and revealed bone on bone in the medial compartment with significant patellofemoral arthritis   Radiology Results from 11/28/2022:  o Right Knee : Standing AP, lateral, tunnel, and skyline views of the knee were obtained and revealed bone on bone in the medial compartment with moderate to severe patellofemoral arthritis.  o Left Knee : Standing AP, lateral, tunnel, and skyline views of the knee were obtained and revealed bone on bone in the medial compartment with moderate to severe patellofemoral arthritis.   MRI Left Shoulder obtained at [facility] on 7/8/2022 revealed: 1. Small intra-articular impaction injury with focal subchondral edema and cystic change seen at the superior lateral humeral head.  2. Degenerative changes the superior labrum with no focal labral tears. 3. Moderate to severe glenohumeral osteoarthritis, joint effusion with synovitis. 0.9 cm intra-articular body is located in the subscapularis recess.  4. Supraspinatus and infraspinatus tendinosis. No visualized rotator cuff tendon tears. 5. Mild subacromial bursitis is demonstrated.  6. Mild osteoarthritis of the acromioclavicular joint.  * Long bicipital tendon: Maintained within the bicipital groove without subluxation or dislocation. No tendinosis or tear.  X-rays done with Dr. Mack on 01/06/2022 - AP, Y, and axillary views were obtained and revealed mild degenerative arthritis of the glenohumeral joint and AC joints with a downsloping acromion.

## 2024-04-04 ENCOUNTER — APPOINTMENT (OUTPATIENT)
Dept: ORTHOPEDIC SURGERY | Facility: CLINIC | Age: 59
End: 2024-04-04
Payer: COMMERCIAL

## 2024-04-04 DIAGNOSIS — M19.012 PRIMARY OSTEOARTHRITIS, LEFT SHOULDER: ICD-10-CM

## 2024-04-04 DIAGNOSIS — G57.11 MERALGIA PARESTHETICA, RIGHT LOWER LIMB: ICD-10-CM

## 2024-04-04 PROCEDURE — 99214 OFFICE O/P EST MOD 30 MIN: CPT

## 2024-04-04 PROCEDURE — 73564 X-RAY EXAM KNEE 4 OR MORE: CPT | Mod: 50

## 2024-04-04 RX ORDER — DICLOFENAC SODIUM 75 MG/1
75 TABLET, DELAYED RELEASE ORAL
Qty: 60 | Refills: 3 | Status: ACTIVE | COMMUNITY
Start: 2024-04-04 | End: 1900-01-01

## 2024-04-04 NOTE — PHYSICAL EXAM
[de-identified] : Constitutional o Appearance : well-nourished, well developed, alert, in no acute distress  Head and Face o Head :  Inspection : atraumatic, normocephalic o Face :  Inspection : no visible rash or discoloration Respiratory o Respiratory Effort: breathing unlabored  Neurologic o Mental Status Examination :  Orientation : grossly oriented to person, place and time Psychiatric o Mood and Affect: mood normal, affect appropriate   Right Upper Extremity  o Right Shoulder :  Inspection/Palpation : no tenderness, no swelling or deformities  Range of Motion : full and painless in all planes, no crepitance  Strength : forward elevation, internal rotation 5/5, external rotation 5/5, external rotation at 90 of abduction, supraspinatus, adduction and abduction, biceps/triceps, 5/5  Stability : no joint instability on provocative testing   Tests: Saul negative, Neer negative, Marbella negative, negative drop arm test , [place]'s test negative   Left Upper Extremity o Left Shoulder :  Inspection/Palpation : no anterior capsular tenderness, bicep tenderness no swelling or deformities  Range of Motion : full forward flexion, full ER, Full IR causes discomfort,  no crepitance  Strength : forward elevation 5/5, internal rotation 5/5, external rotation 5/5, external rotation at 90 of abduction 5/5, supraspinatus 5/5, adduction and abduction 5/5, biceps/triceps 5/5   Stability : no joint instability on provocative testing  Tests: Saul positive Neer negative, Marbella positive, negative drop arm test, [place]'s test positive  Right Lower Extremity o Buttock : no tenderness, swelling or deformities  o Right Hip :  Inspection/Palpation : no tenderness, swelling or deformities  Range of Motion : full and painless in all planes, no crepitance  Stability : joint stability intact  Strength : extension, flexion, adduction, abduction, internal rotation and external rotation, 5/5   o Right Knee :  Inspection/Palpation : no medial or lateral compartment tenderness to palpation, no swelling, defect in the quadriceps muscle  Range of Motion : 0-135, decreased patellofemoral glide with crepitance  Stability : mild valgus or varus instability present on provocative testing  Strength : flexion and extension 5/5  Tests and Signs : Anterior Drawer negative, Lachman negative, Therese's negative  Left Lower Extremity o Buttock : no tenderness, swelling or deformities  o Left Hip :  Inspection/Palpation : no tenderness, no swelling or deformities  Range of Motion : full and painless in all planes, no crepitance  Stability : joint stability intact  Strength : extension, flexion, adduction, abduction, internal rotation and external rotation, 5/5  o Left Knee :  Inspection/Palpation : no medial or lateral compartment tenderness, no medial or lateral facet tenderness, no swelling  Range of Motion : 0-135, no crepitance, decreased patellofemoral glide  Stability : no valgus or varus instability present on provocative testing  Strength : flexion and extension 5/5  Tests and Signs : Anterior Drawer negative, Lachman negative, Therese's negative  Gait: varus deformity bilaterally, no significant extremity swelling or lymphedema, excellent core strength  Radiology Results 4/4/2024 o Right Knee : Standing AP, lateral and tunnel views of the knee were obtained and revealed bone on bone in the medial compartment with moderate patellofemoral arthritis  o Left Knee : Standing AP, lateral and tunnel views of the knee were obtained and revealed bone on bone in the medial compartment with moderate to severe patellofemoral arthritis, unchanged from previous X-rays,

## 2024-04-04 NOTE — HISTORY OF PRESENT ILLNESS
[de-identified] : 58-year-old LHD female presents for a bilateral knee follow-up. She denies any swelling or buckling. She had bilateral Durolane injections on 10/16/2023 and reports no problems. She is 10 days early for her gel injections. She had a left shoulder intra-articular shoulder cortisone injection on 3/04/2024 and reports relief. She states she still has some left shoulder discomfort. Patient denies that she has any numbness or tingling. She states she did not consider that the cold and damp weather can aggravate the left shoulder pain. She states she was dancing and her partner bumped into her and she got a sharp shotting pain down her right leg.   Radiology Results 3/4/2024  o Left Shoulder : AP internal/external rotation and outlet views were obtained and revealed a downsloping acromion with moderate degenerative arthritis  of the glenohumeral joint no lytic lesions slight increased arthritis of Jan 2022  Radiology Results 10/16/2023 o Right Knee : Standing AP, lateral and tunnel views of the knee were obtained and revealed bone on bone in the medial compartment with moderate patellofemoral arthritis  o Left Knee : Standing AP, lateral and tunnel views of the knee were obtained and revealed bone on bone in the medial compartment with significant patellofemoral arthritis   Radiology Results from 11/28/2022:  o Right Knee : Standing AP, lateral, tunnel, and skyline views of the knee were obtained and revealed bone on bone in the medial compartment with moderate to severe patellofemoral arthritis.  o Left Knee : Standing AP, lateral, tunnel, and skyline views of the knee were obtained and revealed bone on bone in the medial compartment with moderate to severe patellofemoral arthritis.   MRI Left Shoulder obtained at [facility] on 7/8/2022 revealed: 1. Small intra-articular impaction injury with focal subchondral edema and cystic change seen at the superior lateral humeral head.  2. Degenerative changes the superior labrum with no focal labral tears. 3. Moderate to severe glenohumeral osteoarthritis, joint effusion with synovitis. 0.9 cm intra-articular body is located in the subscapularis recess.  4. Supraspinatus and infraspinatus tendinosis. No visualized rotator cuff tendon tears. 5. Mild subacromial bursitis is demonstrated.  6. Mild osteoarthritis of the acromioclavicular joint.  * Long bicipital tendon: Maintained within the bicipital groove without subluxation or dislocation. No tendinosis or tear.  X-rays done with Dr. Mack on 01/06/2022 - AP, Y, and axillary views were obtained and revealed mild degenerative arthritis of the glenohumeral joint and AC joints with a downsloping acromion.

## 2024-04-04 NOTE — ADDENDUM
[FreeTextEntry1] : I, AUTUMN CURRY, acted solely as a scribe for Dr. Harley Vance on this date 04/04/2024.  All medical record entries made by the Scribe were at my, Dr. Harley Vance, direction and personally dictated by me on 04/04/2024. I have reviewed the chart and agree that the record accurately reflects my personal performance of the history, physical exam, assessment and plan. I have also personally directed, reviewed, and agreed with the chart.

## 2024-04-04 NOTE — DISCUSSION/SUMMARY
[de-identified] : I went over the pathophysiology of the patient's symptoms in great detail with the patient. I discussed the underlying pathophysiology of the patient's condition in great detail with the patient. I went over the patient's x-rays with them in great detail. I informed the patient they are due for a repeat gel injection any time after 10 days. We are prescribing diclofenac at this time. A prescription was provided.  In terms of the numbness in her thigh we have told her that she probably had a contusion to a sensory nerve in the front of her thigh and that this will go away on its own.  She claims it has improved dramatically.  She will follow-up in 10 days time at which point we will do both of her injections  All of their questions were answered. They understand and consent to the plan.   FU in 10 days

## 2024-04-18 ENCOUNTER — APPOINTMENT (OUTPATIENT)
Dept: ORTHOPEDIC SURGERY | Facility: CLINIC | Age: 59
End: 2024-04-18
Payer: COMMERCIAL

## 2024-04-18 VITALS — BODY MASS INDEX: 24.91 KG/M2 | WEIGHT: 155 LBS | HEIGHT: 66 IN

## 2024-04-18 PROCEDURE — 20611 DRAIN/INJ JOINT/BURSA W/US: CPT | Mod: 50

## 2024-04-18 NOTE — PHYSICAL EXAM
[de-identified] : Constitutional o Appearance : well-nourished, well developed, alert, in no acute distress  Head and Face o Head :  Inspection : atraumatic, normocephalic o Face :  Inspection : no visible rash or discoloration Respiratory o Respiratory Effort: breathing unlabored  Neurologic o Mental Status Examination :  Orientation : grossly oriented to person, place and time Psychiatric o Mood and Affect: mood normal, affect appropriate   Right Upper Extremity  o Right Shoulder :  Inspection/Palpation : no tenderness, no swelling or deformities  Range of Motion : full and painless in all planes, no crepitance  Strength : forward elevation, internal rotation 5/5, external rotation 5/5, external rotation at 90 of abduction, supraspinatus, adduction and abduction, biceps/triceps, 5/5  Stability : no joint instability on provocative testing   Tests: Saul negative, Neer negative, [place] negative, negative drop arm test , [place]'s test negative   Left Upper Extremity o Left Shoulder :  Inspection/Palpation : no anterior capsular tenderness, bicep tenderness no swelling or deformities  Range of Motion : full forward flexion, full ER, Full IR causes discomfort,  no crepitance  Strength : forward elevation 5/5, internal rotation 5/5, external rotation 5/5, external rotation at 90 of abduction 5/5, supraspinatus 5/5, adduction and abduction 5/5, biceps/triceps 5/5   Stability : no joint instability on provocative testing  Tests: Saul positive Neer negative, [place] positive, negative drop arm test, [place]'s test positive  Right Lower Extremity o Buttock : no tenderness, swelling or deformities  o Right Hip :  Inspection/Palpation : no tenderness, swelling or deformities  Range of Motion : full and painless in all planes, no crepitance  Stability : joint stability intact  Strength : extension, flexion, adduction, abduction, internal rotation and external rotation, 5/5   o Right Knee :  Inspection/Palpation : no medial or lateral compartment tenderness to palpation, no swelling, defect in the quadriceps muscle  Range of Motion : 0-135, decreased patellofemoral glide with crepitance  Stability : mild valgus or varus instability present on provocative testing  Strength : flexion and extension 5/5  Tests and Signs : Anterior Drawer negative, Lachman negative, Therese's negative  Left Lower Extremity o Buttock : no tenderness, swelling or deformities  o Left Hip :  Inspection/Palpation : no tenderness, no swelling or deformities  Range of Motion : full and painless in all planes, no crepitance  Stability : joint stability intact  Strength : extension, flexion, adduction, abduction, internal rotation and external rotation, 5/5  o Left Knee :  Inspection/Palpation : no medial or lateral compartment tenderness, no medial or lateral facet tenderness, no swelling  Range of Motion : 0-135, no crepitance, decreased patellofemoral glide  Stability : no valgus or varus instability present on provocative testing  Strength : flexion and extension 5/5  Tests and Signs : Anterior Drawer negative, Lachman negative, Therese's negative  Gait: varus deformity bilaterally, no significant extremity swelling or lymphedema, excellent core strength  o Knee injection : Indication- knee osteoarthritis, Anatomic location- right intra-articular joint space, Spray - area was sterilized with Betadine and alcohol and anesthetized with Ethyl Chloride , needle used-20G, Medications given- 3cc's Durolane under Ultrasound guidance. The patient tolerated the procedure well. Lot#[id number] Exp#2026-08-31  o Knee injection : Indication- knee osteoarthritis, Anatomic location- left intra-articular joint space, Spray - area was sterilized with Betadine and alcohol and anesthetized with Ethyl Chloride , needle used-20G, Medications given- 3cc's Durolane under Ultrasound guidance. The patient tolerated the procedure well.  Lot#[id number] Exp#2026-08-31

## 2024-04-18 NOTE — ADDENDUM
[FreeTextEntry1] : I, AUTUMN CURRY, acted solely as a scribe for Dr. Harley Vance on this date 04/18/2024.  All medical record entries made by the Scribe were at my, Dr. Harley Vance, direction and personally dictated by me on 04/18/2024. I have reviewed the chart and agree that the record accurately reflects my personal performance of the history, physical exam, assessment and plan. I have also personally directed, reviewed, and agreed with the chart.

## 2024-04-18 NOTE — HISTORY OF PRESENT ILLNESS
[de-identified] : 58-year-old LHD female presents for a bilateral knee Durolane injection today 4/18/2024. She denies any swelling or buckling. She had bilateral Durolane injections on 10/16/2023 and reports no problems. She had a left shoulder intra-articular shoulder cortisone injection on 3/04/2024 and reports relief.   Radiology Results 4/4/2024 o Right Knee : Standing AP, lateral and tunnel views of the knee were obtained and revealed bone on bone in the medial compartment with moderate patellofemoral arthritis  o Left Knee : Standing AP, lateral and tunnel views of the knee were obtained and revealed bone on bone in the medial compartment with moderate to severe patellofemoral arthritis, unchanged from previous X-rays,   Radiology Results 3/4/2024  o Left Shoulder : AP internal/external rotation and outlet views were obtained and revealed a downsloping acromion with moderate degenerative arthritis  of the glenohumeral joint no lytic lesions slight increased arthritis of Jan 2022  Radiology Results 10/16/2023 o Right Knee : Standing AP, lateral and tunnel views of the knee were obtained and revealed bone on bone in the medial compartment with moderate patellofemoral arthritis  o Left Knee : Standing AP, lateral and tunnel views of the knee were obtained and revealed bone on bone in the medial compartment with significant patellofemoral arthritis   Radiology Results from 11/28/2022:  o Right Knee : Standing AP, lateral, tunnel, and skyline views of the knee were obtained and revealed bone on bone in the medial compartment with moderate to severe patellofemoral arthritis.  o Left Knee : Standing AP, lateral, tunnel, and skyline views of the knee were obtained and revealed bone on bone in the medial compartment with moderate to severe patellofemoral arthritis.   MRI Left Shoulder obtained at [facility] on 7/8/2022 revealed: 1. Small intra-articular impaction injury with focal subchondral edema and cystic change seen at the superior lateral humeral head.  2. Degenerative changes the superior labrum with no focal labral tears. 3. Moderate to severe glenohumeral osteoarthritis, joint effusion with synovitis. 0.9 cm intra-articular body is located in the subscapularis recess.  4. Supraspinatus and infraspinatus tendinosis. No visualized rotator cuff tendon tears. 5. Mild subacromial bursitis is demonstrated.  6. Mild osteoarthritis of the acromioclavicular joint.  * Long bicipital tendon: Maintained within the bicipital groove without subluxation or dislocation. No tendinosis or tear.  X-rays done with Dr. Mack on 01/06/2022 - AP, Y, and axillary views were obtained and revealed mild degenerative arthritis of the glenohumeral joint and AC joints with a downsloping acromion.

## 2024-04-18 NOTE — DISCUSSION/SUMMARY
[de-identified] : I went over the pathophysiology of the patient's symptoms in great detail with the patient. The patient elected to receive a Durolane injection into her bilateral knee today, and tolerated it well. I instructed the patient on ROM exercises, and told them to take it easy. The use of ice and rest was reviewed with the patient. The patient may resume activities tomorrow. I reminded the patient that it takes 4 to 6 weeks after the final injection to feel symptom relief.   All of their questions were answered. They understand and consent to the plan.   FU in 6 weeks.

## 2024-06-03 ENCOUNTER — APPOINTMENT (OUTPATIENT)
Dept: ORTHOPEDIC SURGERY | Facility: CLINIC | Age: 59
End: 2024-06-03
Payer: COMMERCIAL

## 2024-06-03 VITALS — BODY MASS INDEX: 23.46 KG/M2 | HEIGHT: 66 IN | WEIGHT: 146 LBS

## 2024-06-03 DIAGNOSIS — M17.0 BILATERAL PRIMARY OSTEOARTHRITIS OF KNEE: ICD-10-CM

## 2024-06-03 PROCEDURE — 99213 OFFICE O/P EST LOW 20 MIN: CPT

## 2024-06-03 NOTE — DISCUSSION/SUMMARY
[de-identified] : I went over the pathophysiology of the patient's symptoms in great detail with the patient. She needs to avoid high-impact activities such as running and jumping or riding a treadmill. I recommend alternative activities such as riding a stationary bike or elliptical on low tension. She should focus on light weight and high repetition exercises. She should avoid squatting and kneeling. We discussed the use of ice, Tylenol and anti-inflammatories to relieve pain.   All of their questions were answered. They understand and consent to the plan.   FU in September.

## 2024-06-03 NOTE — ADDENDUM
[FreeTextEntry1] : I, AUTUMN CURRY, acted solely as a scribe for Dr. Harley Vance on this date 06/03/2024.  All medical record entries made by the Scribe were at my, Dr. Harley Vance, direction and personally dictated by me on 06/03/2024. I have reviewed the chart and agree that the record accurately reflects my personal performance of the history, physical exam, assessment and plan. I have also personally directed, reviewed, and agreed with the chart.

## 2024-06-03 NOTE — PHYSICAL EXAM
[de-identified] : Constitutional o Appearance : well-nourished, well developed, alert, in no acute distress  Head and Face o Head :  Inspection : atraumatic, normocephalic o Face :  Inspection : no visible rash or discoloration Respiratory o Respiratory Effort: breathing unlabored  Neurologic o Mental Status Examination :  Orientation : grossly oriented to person, place and time Psychiatric o Mood and Affect: mood normal, affect appropriate   Right Upper Extremity  o Right Shoulder :  Inspection/Palpation : no tenderness, no swelling or deformities  Range of Motion : full and painless in all planes, no crepitance  Strength : forward elevation, internal rotation 5/5, external rotation 5/5, external rotation at 90 of abduction, supraspinatus, adduction and abduction, biceps/triceps, 5/5  Stability : no joint instability on provocative testing   Tests: Saul negative, Neer negative, Marbella negative, negative drop arm test , [place]'s test negative   Left Upper Extremity o Left Shoulder :  Inspection/Palpation : no anterior capsular tenderness, bicep tenderness no swelling or deformities  Range of Motion : full forward flexion, full ER, Full IR causes discomfort,  no crepitance  Strength : forward elevation 5/5, internal rotation 5/5, external rotation 5/5, external rotation at 90 of abduction 5/5, supraspinatus 5/5, adduction and abduction 5/5, biceps/triceps 5/5   Stability : no joint instability on provocative testing  Tests: Saul positive Neer negative, Marbella positive, negative drop arm test, [place]'s test positive  Right Lower Extremity o Buttock : no tenderness, swelling or deformities  o Right Hip :  Inspection/Palpation : no tenderness, swelling or deformities  Range of Motion : full and painless in all planes, no crepitance  Stability : joint stability intact  Strength : extension, flexion, adduction, abduction, internal rotation and external rotation, 5/5   o Right Knee :  Inspection/Palpation : no medial or lateral compartment tenderness to palpation, no swelling, defect in the quadriceps muscle  Range of Motion : 0-130, decreased patellofemoral glide with crepitance  Stability : mild valgus or varus instability present on provocative testing  Strength : flexion and extension 5/5  Tests and Signs : Anterior Drawer negative, Lachman negative, Therese's negative  Left Lower Extremity o Buttock : no tenderness, swelling or deformities  o Left Hip :  Inspection/Palpation : no tenderness, no swelling or deformities  Range of Motion : full and painless in all planes, no crepitance  Stability : joint stability intact  Strength : extension, flexion, adduction, abduction, internal rotation and external rotation, 5/5  o Left Knee :  Inspection/Palpation : no medial or lateral compartment tenderness, no medial or lateral facet tenderness, no swelling  Range of Motion : 0-130, no crepitance, decreased patellofemoral glide  Stability : no valgus or varus instability present on provocative testing  Strength : flexion and extension 5/5  Tests and Signs : Anterior Drawer negative, Lachman negative, Therese's negative  Gait: varus deformity bilaterally, right hamstrings bilateral, no significant extremity swelling or lymphedema, excellent core strength

## 2024-06-03 NOTE — HISTORY OF PRESENT ILLNESS
[de-identified] : 58-year-old LHD female presents for a bilateral knee follow-up. She had bilateral knee  Durolane injection on 4/18/2024 and notes she is doing well. She notes the injections helped. She notes she is 80% better. She denies any swelling or buckling. She notes she is experiencing some right knee pain on the medial aspect. She notes she did 30 miles of biking yesterday with no knee discomfort. Patient denies taking any medication to manage her pain.  She had a left shoulder intra-articular shoulder cortisone injection on 3/04/2024 and reports relief.   Radiology Results 4/4/2024 o Right Knee : Standing AP, lateral and tunnel views of the knee were obtained and revealed bone on bone in the medial compartment with moderate patellofemoral arthritis  o Left Knee : Standing AP, lateral and tunnel views of the knee were obtained and revealed bone on bone in the medial compartment with moderate to severe patellofemoral arthritis, unchanged from previous X-rays,   Radiology Results 3/4/2024  o Left Shoulder : AP internal/external rotation and outlet views were obtained and revealed a downsloping acromion with moderate degenerative arthritis  of the glenohumeral joint no lytic lesions slight increased arthritis of Jan 2022  Radiology Results 10/16/2023 o Right Knee : Standing AP, lateral and tunnel views of the knee were obtained and revealed bone on bone in the medial compartment with moderate patellofemoral arthritis  o Left Knee : Standing AP, lateral and tunnel views of the knee were obtained and revealed bone on bone in the medial compartment with significant patellofemoral arthritis   Radiology Results from 11/28/2022:  o Right Knee : Standing AP, lateral, tunnel, and skyline views of the knee were obtained and revealed bone on bone in the medial compartment with moderate to severe patellofemoral arthritis.  o Left Knee : Standing AP, lateral, tunnel, and skyline views of the knee were obtained and revealed bone on bone in the medial compartment with moderate to severe patellofemoral arthritis.   MRI Left Shoulder obtained at [facility] on 7/8/2022 revealed: 1. Small intra-articular impaction injury with focal subchondral edema and cystic change seen at the superior lateral humeral head.  2. Degenerative changes the superior labrum with no focal labral tears. 3. Moderate to severe glenohumeral osteoarthritis, joint effusion with synovitis. 0.9 cm intra-articular body is located in the subscapularis recess.  4. Supraspinatus and infraspinatus tendinosis. No visualized rotator cuff tendon tears. 5. Mild subacromial bursitis is demonstrated.  6. Mild osteoarthritis of the acromioclavicular joint.  * Long bicipital tendon: Maintained within the bicipital groove without subluxation or dislocation. No tendinosis or tear.  X-rays done with Dr. Mack on 01/06/2022 - AP, Y, and axillary views were obtained and revealed mild degenerative arthritis of the glenohumeral joint and AC joints with a downsloping acromion.

## 2024-06-10 ENCOUNTER — APPOINTMENT (OUTPATIENT)
Dept: ULTRASOUND IMAGING | Facility: IMAGING CENTER | Age: 59
End: 2024-06-10
Payer: COMMERCIAL

## 2024-06-10 ENCOUNTER — OUTPATIENT (OUTPATIENT)
Dept: OUTPATIENT SERVICES | Facility: HOSPITAL | Age: 59
LOS: 1 days | End: 2024-06-10
Payer: COMMERCIAL

## 2024-06-10 ENCOUNTER — APPOINTMENT (OUTPATIENT)
Dept: MAMMOGRAPHY | Facility: IMAGING CENTER | Age: 59
End: 2024-06-10
Payer: COMMERCIAL

## 2024-06-10 DIAGNOSIS — Z00.8 ENCOUNTER FOR OTHER GENERAL EXAMINATION: ICD-10-CM

## 2024-06-10 PROCEDURE — 76641 ULTRASOUND BREAST COMPLETE: CPT | Mod: 26,50

## 2024-06-10 PROCEDURE — 77067 SCR MAMMO BI INCL CAD: CPT | Mod: 26

## 2024-06-10 PROCEDURE — 77063 BREAST TOMOSYNTHESIS BI: CPT | Mod: 26

## 2024-06-10 PROCEDURE — 77067 SCR MAMMO BI INCL CAD: CPT

## 2024-06-10 PROCEDURE — 77063 BREAST TOMOSYNTHESIS BI: CPT

## 2024-06-10 PROCEDURE — 76641 ULTRASOUND BREAST COMPLETE: CPT

## 2024-06-17 ENCOUNTER — OUTPATIENT (OUTPATIENT)
Dept: OUTPATIENT SERVICES | Facility: HOSPITAL | Age: 59
LOS: 1 days | End: 2024-06-17
Payer: COMMERCIAL

## 2024-06-17 ENCOUNTER — APPOINTMENT (OUTPATIENT)
Dept: ULTRASOUND IMAGING | Facility: IMAGING CENTER | Age: 59
End: 2024-06-17
Payer: COMMERCIAL

## 2024-06-17 DIAGNOSIS — Z00.8 ENCOUNTER FOR OTHER GENERAL EXAMINATION: ICD-10-CM

## 2024-06-17 PROCEDURE — 76642 ULTRASOUND BREAST LIMITED: CPT

## 2024-06-17 PROCEDURE — 76642 ULTRASOUND BREAST LIMITED: CPT | Mod: 26,LT

## 2025-01-17 ENCOUNTER — APPOINTMENT (OUTPATIENT)
Dept: ULTRASOUND IMAGING | Facility: IMAGING CENTER | Age: 60
End: 2025-01-17
Payer: COMMERCIAL

## 2025-01-17 ENCOUNTER — OUTPATIENT (OUTPATIENT)
Dept: OUTPATIENT SERVICES | Facility: HOSPITAL | Age: 60
LOS: 1 days | End: 2025-01-17
Payer: COMMERCIAL

## 2025-01-17 DIAGNOSIS — Z00.8 ENCOUNTER FOR OTHER GENERAL EXAMINATION: ICD-10-CM

## 2025-01-17 PROCEDURE — 76641 ULTRASOUND BREAST COMPLETE: CPT | Mod: 26,LT

## 2025-01-17 PROCEDURE — 76641 ULTRASOUND BREAST COMPLETE: CPT

## 2025-01-27 ENCOUNTER — APPOINTMENT (OUTPATIENT)
Dept: ORTHOPEDIC SURGERY | Facility: CLINIC | Age: 60
End: 2025-01-27
Payer: COMMERCIAL

## 2025-01-27 DIAGNOSIS — M65.919 UNSP SYNOVITIS AND TENOSYNOVITIS, UNSPECIFIED SHOULDER: ICD-10-CM

## 2025-01-27 DIAGNOSIS — M19.012 PRIMARY OSTEOARTHRITIS, LEFT SHOULDER: ICD-10-CM

## 2025-01-27 PROCEDURE — 20611 DRAIN/INJ JOINT/BURSA W/US: CPT | Mod: LT

## 2025-01-27 PROCEDURE — 99213 OFFICE O/P EST LOW 20 MIN: CPT | Mod: 25

## 2025-01-27 PROCEDURE — 73030 X-RAY EXAM OF SHOULDER: CPT | Mod: LT

## 2025-05-20 ENCOUNTER — APPOINTMENT (OUTPATIENT)
Dept: ORTHOPEDIC SURGERY | Facility: CLINIC | Age: 60
End: 2025-05-20
Payer: COMMERCIAL

## 2025-05-20 DIAGNOSIS — M17.0 BILATERAL PRIMARY OSTEOARTHRITIS OF KNEE: ICD-10-CM

## 2025-05-20 DIAGNOSIS — M76.899 OTHER SPECIFIED ENTHESOPATHIES OF UNSPECIFIED LOWER LIMB, EXCLUDING FOOT: ICD-10-CM

## 2025-05-20 PROCEDURE — 73564 X-RAY EXAM KNEE 4 OR MORE: CPT | Mod: 50

## 2025-05-20 PROCEDURE — 99214 OFFICE O/P EST MOD 30 MIN: CPT

## 2025-05-20 RX ORDER — HYALURONATE SODIUM, STABILIZED 88 MG/4 ML
88 SYRINGE (ML) INTRAARTICULAR
Qty: 2 | Refills: 0 | Status: ACTIVE | COMMUNITY
Start: 2025-05-20

## 2025-06-10 ENCOUNTER — APPOINTMENT (OUTPATIENT)
Dept: ORTHOPEDIC SURGERY | Facility: CLINIC | Age: 60
End: 2025-06-10
Payer: COMMERCIAL

## 2025-06-10 PROCEDURE — 99213 OFFICE O/P EST LOW 20 MIN: CPT | Mod: 25

## 2025-06-10 PROCEDURE — 20611 DRAIN/INJ JOINT/BURSA W/US: CPT | Mod: 50

## 2025-08-22 ENCOUNTER — NON-APPOINTMENT (OUTPATIENT)
Age: 60
End: 2025-08-22

## 2025-08-22 ENCOUNTER — APPOINTMENT (OUTPATIENT)
Facility: CLINIC | Age: 60
End: 2025-08-22
Payer: COMMERCIAL

## 2025-08-22 VITALS
DIASTOLIC BLOOD PRESSURE: 85 MMHG | OXYGEN SATURATION: 98 % | HEART RATE: 78 BPM | WEIGHT: 153.13 LBS | SYSTOLIC BLOOD PRESSURE: 123 MMHG | BODY MASS INDEX: 24.72 KG/M2

## 2025-08-22 PROCEDURE — 99459 PELVIC EXAMINATION: CPT

## 2025-08-22 PROCEDURE — 57421 EXAM/BIOPSY OF VAG W/SCOPE: CPT

## 2025-08-22 PROCEDURE — 99204 OFFICE O/P NEW MOD 45 MIN: CPT | Mod: 25

## 2025-09-03 ENCOUNTER — NON-APPOINTMENT (OUTPATIENT)
Age: 60
End: 2025-09-03